# Patient Record
Sex: FEMALE | Race: WHITE | NOT HISPANIC OR LATINO
[De-identification: names, ages, dates, MRNs, and addresses within clinical notes are randomized per-mention and may not be internally consistent; named-entity substitution may affect disease eponyms.]

---

## 2017-02-01 ENCOUNTER — RESULT REVIEW (OUTPATIENT)
Age: 47
End: 2017-02-01

## 2017-02-16 ENCOUNTER — APPOINTMENT (OUTPATIENT)
Dept: VASCULAR SURGERY | Facility: CLINIC | Age: 47
End: 2017-02-16

## 2017-02-16 VITALS
TEMPERATURE: 98.1 F | HEART RATE: 78 BPM | HEIGHT: 68 IN | WEIGHT: 168 LBS | BODY MASS INDEX: 25.46 KG/M2 | SYSTOLIC BLOOD PRESSURE: 125 MMHG | DIASTOLIC BLOOD PRESSURE: 80 MMHG

## 2017-02-16 VITALS — HEART RATE: 76 BPM | DIASTOLIC BLOOD PRESSURE: 75 MMHG | SYSTOLIC BLOOD PRESSURE: 112 MMHG

## 2017-02-16 DIAGNOSIS — I86.8 VARICOSE VEINS OF OTHER SPECIFIED SITES: ICD-10-CM

## 2017-03-01 ENCOUNTER — OTHER (OUTPATIENT)
Age: 47
End: 2017-03-01

## 2017-03-06 ENCOUNTER — APPOINTMENT (OUTPATIENT)
Dept: VASCULAR SURGERY | Facility: CLINIC | Age: 47
End: 2017-03-06

## 2017-03-22 RX ORDER — ALPRAZOLAM 0.5 MG/1
0.5 TABLET ORAL
Qty: 1 | Refills: 0 | Status: COMPLETED | COMMUNITY
Start: 2017-03-01 | End: 2017-03-22

## 2017-03-23 ENCOUNTER — MEDICATION RENEWAL (OUTPATIENT)
Age: 47
End: 2017-03-23

## 2017-03-23 ENCOUNTER — APPOINTMENT (OUTPATIENT)
Dept: VASCULAR SURGERY | Facility: CLINIC | Age: 47
End: 2017-03-23

## 2017-03-23 VITALS
HEART RATE: 78 BPM | TEMPERATURE: 97.7 F | WEIGHT: 168 LBS | HEIGHT: 68 IN | DIASTOLIC BLOOD PRESSURE: 72 MMHG | SYSTOLIC BLOOD PRESSURE: 139 MMHG | BODY MASS INDEX: 25.46 KG/M2

## 2017-03-23 VITALS — DIASTOLIC BLOOD PRESSURE: 85 MMHG | HEART RATE: 76 BPM | SYSTOLIC BLOOD PRESSURE: 139 MMHG

## 2017-03-23 DIAGNOSIS — Z78.9 OTHER SPECIFIED HEALTH STATUS: ICD-10-CM

## 2017-03-23 DIAGNOSIS — Z87.891 PERSONAL HISTORY OF NICOTINE DEPENDENCE: ICD-10-CM

## 2017-03-23 DIAGNOSIS — F17.200 NICOTINE DEPENDENCE, UNSPECIFIED, UNCOMPLICATED: ICD-10-CM

## 2017-03-23 DIAGNOSIS — Z82.49 FAMILY HISTORY OF ISCHEMIC HEART DISEASE AND OTHER DISEASES OF THE CIRCULATORY SYSTEM: ICD-10-CM

## 2017-03-24 PROBLEM — F17.200 CURRENT SOME DAY SMOKER: Status: ACTIVE | Noted: 2017-02-16

## 2017-03-24 PROBLEM — Z82.49 FAMILY HISTORY OF HYPERTENSION: Status: ACTIVE | Noted: 2017-02-16

## 2017-03-24 PROBLEM — Z78.9 DRINKS WINE: Status: ACTIVE | Noted: 2017-02-16

## 2017-03-24 RX ORDER — ALPRAZOLAM 0.5 MG/1
0.5 TABLET ORAL
Qty: 1 | Refills: 0 | Status: COMPLETED | COMMUNITY
Start: 2017-03-23 | End: 2017-03-23

## 2017-03-27 ENCOUNTER — MEDICATION RENEWAL (OUTPATIENT)
Age: 47
End: 2017-03-27

## 2017-03-31 ENCOUNTER — APPOINTMENT (OUTPATIENT)
Dept: VASCULAR SURGERY | Facility: CLINIC | Age: 47
End: 2017-03-31

## 2017-04-20 ENCOUNTER — APPOINTMENT (OUTPATIENT)
Dept: VASCULAR SURGERY | Facility: CLINIC | Age: 47
End: 2017-04-20

## 2017-04-20 VITALS
SYSTOLIC BLOOD PRESSURE: 111 MMHG | DIASTOLIC BLOOD PRESSURE: 72 MMHG | WEIGHT: 168 LBS | BODY MASS INDEX: 25.46 KG/M2 | HEART RATE: 91 BPM | TEMPERATURE: 98.2 F | HEIGHT: 68 IN

## 2017-04-20 VITALS — HEART RATE: 78 BPM | DIASTOLIC BLOOD PRESSURE: 62 MMHG | SYSTOLIC BLOOD PRESSURE: 103 MMHG

## 2017-04-20 RX ORDER — NITROFURANTOIN (MONOHYDRATE/MACROCRYSTALS) 25; 75 MG/1; MG/1
100 CAPSULE ORAL
Qty: 14 | Refills: 0 | Status: ACTIVE | COMMUNITY
Start: 2016-11-29

## 2017-04-20 RX ORDER — CYCLOBENZAPRINE HYDROCHLORIDE 5 MG/1
5 TABLET, FILM COATED ORAL
Qty: 30 | Refills: 0 | Status: ACTIVE | COMMUNITY
Start: 2017-01-28

## 2017-04-20 RX ORDER — ECONAZOLE NITRATE 10 MG/G
1 CREAM TOPICAL
Qty: 85 | Refills: 0 | Status: ACTIVE | COMMUNITY
Start: 2017-01-28

## 2017-04-20 RX ORDER — SILVER SULFADIAZINE 10 MG/G
1 CREAM TOPICAL
Qty: 50 | Refills: 0 | Status: ACTIVE | COMMUNITY
Start: 2017-04-04

## 2017-04-21 ENCOUNTER — APPOINTMENT (OUTPATIENT)
Dept: VASCULAR SURGERY | Facility: CLINIC | Age: 47
End: 2017-04-21

## 2018-04-27 ENCOUNTER — RESULT REVIEW (OUTPATIENT)
Age: 48
End: 2018-04-27

## 2019-03-20 ENCOUNTER — OUTPATIENT (OUTPATIENT)
Dept: OUTPATIENT SERVICES | Facility: HOSPITAL | Age: 49
LOS: 1 days | End: 2019-03-20
Payer: COMMERCIAL

## 2019-03-20 VITALS
RESPIRATION RATE: 18 BRPM | HEIGHT: 67 IN | DIASTOLIC BLOOD PRESSURE: 86 MMHG | TEMPERATURE: 99 F | SYSTOLIC BLOOD PRESSURE: 128 MMHG | OXYGEN SATURATION: 98 % | WEIGHT: 182.1 LBS | HEART RATE: 76 BPM

## 2019-03-20 DIAGNOSIS — Z98.890 OTHER SPECIFIED POSTPROCEDURAL STATES: Chronic | ICD-10-CM

## 2019-03-20 DIAGNOSIS — D25.9 LEIOMYOMA OF UTERUS, UNSPECIFIED: ICD-10-CM

## 2019-03-20 DIAGNOSIS — Z01.818 ENCOUNTER FOR OTHER PREPROCEDURAL EXAMINATION: ICD-10-CM

## 2019-03-20 LAB
HCT VFR BLD CALC: 41.5 % — SIGNIFICANT CHANGE UP (ref 34.5–45)
HGB BLD-MCNC: 13.2 G/DL — SIGNIFICANT CHANGE UP (ref 11.5–15.5)
MCHC RBC-ENTMCNC: 30.6 PG — SIGNIFICANT CHANGE UP (ref 27–34)
MCHC RBC-ENTMCNC: 31.8 GM/DL — LOW (ref 32–36)
MCV RBC AUTO: 96.1 FL — SIGNIFICANT CHANGE UP (ref 80–100)
PLATELET # BLD AUTO: 235 K/UL — SIGNIFICANT CHANGE UP (ref 150–400)
RBC # BLD: 4.32 M/UL — SIGNIFICANT CHANGE UP (ref 3.8–5.2)
RBC # FLD: 12.8 % — SIGNIFICANT CHANGE UP (ref 10.3–14.5)
WBC # BLD: 4.18 K/UL — SIGNIFICANT CHANGE UP (ref 3.8–10.5)
WBC # FLD AUTO: 4.18 K/UL — SIGNIFICANT CHANGE UP (ref 3.8–10.5)

## 2019-03-20 PROCEDURE — G0463: CPT

## 2019-03-20 PROCEDURE — 85027 COMPLETE CBC AUTOMATED: CPT

## 2019-03-20 RX ORDER — LIDOCAINE HCL 20 MG/ML
0.2 VIAL (ML) INJECTION ONCE
Qty: 0 | Refills: 0 | Status: DISCONTINUED | OUTPATIENT
Start: 2019-03-22 | End: 2019-04-06

## 2019-03-20 RX ORDER — SODIUM CHLORIDE 9 MG/ML
3 INJECTION INTRAMUSCULAR; INTRAVENOUS; SUBCUTANEOUS EVERY 8 HOURS
Qty: 0 | Refills: 0 | Status: DISCONTINUED | OUTPATIENT
Start: 2019-03-22 | End: 2019-04-06

## 2019-03-20 NOTE — H&P PST ADULT - NSANTHOSAYNRD_GEN_A_CORE
No. ANEUDY screening performed.  STOP BANG Legend: 0-2 = LOW Risk; 3-4 = INTERMEDIATE Risk; 5-8 = HIGH Risk

## 2019-03-20 NOTE — H&P PST ADULT - NSICDXPASTMEDICALHX_GEN_ALL_CORE_FT
PAST MEDICAL HISTORY:  Anxiety xanax rare occasion    Gluteal pain sees a chiropractor    History of tinea on bk since agwe 13    Uterine fibroid see hpi

## 2019-03-20 NOTE — H&P PST ADULT - HISTORY OF PRESENT ILLNESS
This is a 48 year old G 2 P 2002, LMP 3-05-19.  Pt had  abnormal menses x 23 months.  Pt went to GYN and transvaginal sonogram was done.  Pt had a hysterosalpingogram done and fibroid diagnosed.  Now scheduled for D&C  operative hysteroscopy with bettie on 3-22-19 This is a 48 year old G 2 P 2002, LMP 3-05-19.  Pt had  abnormal menses x 23 months.  Pt went to GYN and transvaginal sonogram was done.  Pt had a Sonohystrogram done and fibroid diagnosed.  Now scheduled for D&C  operative hysteroscopy with bettie on 3-22-19

## 2019-03-20 NOTE — H&P PST ADULT - NSANTHSNORERD_ENT_A_CORE
Principal Discharge DX:	Abdominal pain  Instructions for follow-up, activity and diet:	1) Please return to the ED should you have any new or worsening symptoms, worsening pain, develop fever, chills, nausea, vomiting, or diarrhea, or recurrent painful episodes of abdominal pain  2) Please follow up with your primary care physician in 1-2 days
No

## 2019-03-20 NOTE — H&P PST ADULT - NSICDXPROBLEM_GEN_ALL_CORE_FT
PROBLEM DIAGNOSES  Problem: Uterine fibroid  Assessment and Plan: D&C operative hysteroscopy, symphion

## 2019-03-21 ENCOUNTER — TRANSCRIPTION ENCOUNTER (OUTPATIENT)
Age: 49
End: 2019-03-21

## 2019-03-22 ENCOUNTER — RESULT REVIEW (OUTPATIENT)
Age: 49
End: 2019-03-22

## 2019-03-22 ENCOUNTER — OUTPATIENT (OUTPATIENT)
Dept: OUTPATIENT SERVICES | Facility: HOSPITAL | Age: 49
LOS: 1 days | End: 2019-03-22
Payer: COMMERCIAL

## 2019-03-22 VITALS
OXYGEN SATURATION: 98 % | HEART RATE: 76 BPM | RESPIRATION RATE: 18 BRPM | SYSTOLIC BLOOD PRESSURE: 130 MMHG | TEMPERATURE: 99 F | DIASTOLIC BLOOD PRESSURE: 88 MMHG | WEIGHT: 182.1 LBS | HEIGHT: 67 IN

## 2019-03-22 VITALS
HEART RATE: 77 BPM | DIASTOLIC BLOOD PRESSURE: 81 MMHG | RESPIRATION RATE: 16 BRPM | OXYGEN SATURATION: 100 % | SYSTOLIC BLOOD PRESSURE: 135 MMHG

## 2019-03-22 DIAGNOSIS — Z98.890 OTHER SPECIFIED POSTPROCEDURAL STATES: Chronic | ICD-10-CM

## 2019-03-22 DIAGNOSIS — D25.9 LEIOMYOMA OF UTERUS, UNSPECIFIED: ICD-10-CM

## 2019-03-22 PROCEDURE — 88305 TISSUE EXAM BY PATHOLOGIST: CPT

## 2019-03-22 PROCEDURE — 58561 HYSTEROSCOPY REMOVE MYOMA: CPT

## 2019-03-22 PROCEDURE — 88305 TISSUE EXAM BY PATHOLOGIST: CPT | Mod: 26

## 2019-03-22 RX ORDER — ACETAMINOPHEN 500 MG
1000 TABLET ORAL ONCE
Qty: 0 | Refills: 0 | Status: COMPLETED | OUTPATIENT
Start: 2019-03-22 | End: 2019-03-22

## 2019-03-22 RX ORDER — OXYCODONE HYDROCHLORIDE 5 MG/1
5 TABLET ORAL ONCE
Qty: 0 | Refills: 0 | Status: DISCONTINUED | OUTPATIENT
Start: 2019-03-22 | End: 2019-03-22

## 2019-03-22 RX ORDER — ONDANSETRON 8 MG/1
4 TABLET, FILM COATED ORAL ONCE
Qty: 0 | Refills: 0 | Status: DISCONTINUED | OUTPATIENT
Start: 2019-03-22 | End: 2019-04-06

## 2019-03-22 RX ORDER — CELECOXIB 200 MG/1
200 CAPSULE ORAL ONCE
Qty: 0 | Refills: 0 | Status: DISCONTINUED | OUTPATIENT
Start: 2019-03-22 | End: 2019-04-06

## 2019-03-22 RX ORDER — CELECOXIB 200 MG/1
200 CAPSULE ORAL ONCE
Qty: 0 | Refills: 0 | Status: COMPLETED | OUTPATIENT
Start: 2019-03-22 | End: 2019-03-22

## 2019-03-22 RX ORDER — HYDROMORPHONE HYDROCHLORIDE 2 MG/ML
0.5 INJECTION INTRAMUSCULAR; INTRAVENOUS; SUBCUTANEOUS
Qty: 0 | Refills: 0 | Status: DISCONTINUED | OUTPATIENT
Start: 2019-03-22 | End: 2019-03-22

## 2019-03-22 RX ORDER — METOCLOPRAMIDE HCL 10 MG
10 TABLET ORAL ONCE
Qty: 0 | Refills: 0 | Status: DISCONTINUED | OUTPATIENT
Start: 2019-03-22 | End: 2019-04-06

## 2019-03-22 RX ADMIN — Medication 1000 MILLIGRAM(S): at 11:05

## 2019-03-22 RX ADMIN — CELECOXIB 200 MILLIGRAM(S): 200 CAPSULE ORAL at 11:05

## 2019-03-22 NOTE — BRIEF OPERATIVE NOTE - NSICDXBRIEFPOSTOP_GEN_ALL_CORE_FT
POST-OP DIAGNOSIS:  Submucous uterine fibroid 22-Mar-2019 12:35:24  Savannah Saab  Menorrhagia 22-Mar-2019 12:35:16  Savannah Saab

## 2019-03-22 NOTE — BRIEF OPERATIVE NOTE - NSICDXBRIEFPREOP_GEN_ALL_CORE_FT
PRE-OP DIAGNOSIS:  Submucous uterine fibroid 22-Mar-2019 12:34:53  Savannah Saab  Menorrhagia 22-Mar-2019 12:34:06  Savannah Saab

## 2019-03-22 NOTE — ASU DISCHARGE PLAN (ADULT/PEDIATRIC) - CARE PROVIDER_API CALL
jacque,   Phone: (   )    -  Fax: (   )    -  Follow Up Time:     Savannah Saab)  Obstetrics and Gynecology  [address] [facility], [facility] [address]  [place] [address]  Phone: [phone]  Fax: [phone]  Follow Up Time:

## 2019-06-05 ENCOUNTER — RESULT REVIEW (OUTPATIENT)
Age: 49
End: 2019-06-05

## 2020-12-04 ENCOUNTER — RESULT REVIEW (OUTPATIENT)
Age: 50
End: 2020-12-04
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
## 2022-04-07 ENCOUNTER — RESULT REVIEW (OUTPATIENT)
Age: 52
End: 2022-04-07

## 2022-10-20 PROBLEM — Z86.19 PERSONAL HISTORY OF OTHER INFECTIOUS AND PARASITIC DISEASES: Chronic | Status: ACTIVE | Noted: 2019-03-20

## 2022-10-20 PROBLEM — M79.18 MYALGIA, OTHER SITE: Chronic | Status: ACTIVE | Noted: 2019-03-20

## 2022-10-20 PROBLEM — D25.9 LEIOMYOMA OF UTERUS, UNSPECIFIED: Chronic | Status: ACTIVE | Noted: 2019-03-20

## 2022-10-20 PROBLEM — F41.9 ANXIETY DISORDER, UNSPECIFIED: Chronic | Status: ACTIVE | Noted: 2019-03-20

## 2022-12-01 ENCOUNTER — APPOINTMENT (OUTPATIENT)
Dept: VASCULAR SURGERY | Facility: CLINIC | Age: 52
End: 2022-12-01

## 2022-12-01 VITALS
HEART RATE: 77 BPM | DIASTOLIC BLOOD PRESSURE: 87 MMHG | BODY MASS INDEX: 26.52 KG/M2 | WEIGHT: 175 LBS | HEIGHT: 68 IN | TEMPERATURE: 98.2 F | SYSTOLIC BLOOD PRESSURE: 120 MMHG

## 2022-12-01 VITALS — HEART RATE: 72 BPM | SYSTOLIC BLOOD PRESSURE: 125 MMHG | DIASTOLIC BLOOD PRESSURE: 83 MMHG

## 2022-12-01 DIAGNOSIS — I83.899 VARICOSE VEINS OF UNSPECIFIED LOWER EXTREMITY WITH OTHER COMPLICATIONS: ICD-10-CM

## 2022-12-01 PROCEDURE — 93970 EXTREMITY STUDY: CPT

## 2022-12-01 PROCEDURE — 99204 OFFICE O/P NEW MOD 45 MIN: CPT

## 2022-12-16 ENCOUNTER — APPOINTMENT (OUTPATIENT)
Dept: VASCULAR SURGERY | Facility: CLINIC | Age: 52
End: 2022-12-16

## 2022-12-16 PROCEDURE — 37766 PHLEB VEINS - EXTREM 20+: CPT | Mod: LT

## 2022-12-16 RX ORDER — ALPRAZOLAM 0.5 MG/1
0.5 TABLET ORAL
Qty: 1 | Refills: 0 | Status: COMPLETED | COMMUNITY
Start: 2017-03-27 | End: 2022-12-16

## 2022-12-16 NOTE — PROCEDURE
[FreeTextEntry1] : left stab phlebectomy [FreeTextEntry3] : Procedural safety checklist and time out completed:\par Confirmed patient identification (Patient Name, , and/or medical record number including when possible affirmation by patient or parent/family/other.\par Confirmed procedure with the patient. Consent present, accurate and signed. \par Confirmed special equipment and supplies are present.\par Sterility confirmed. Position verified. \par Site/ side is marked and visible and confirmed. \par Procedure confirmed by consent. Accurate consent including side and site.\par Review of medical records noting correct procedure including site and side.\par MD/PA verifies presence and review of imaging studies and or written report of imaging studies.\par Specify equipment are available for the planned procedure.\par MD/PA has marked the patient's procedural site and side.\par Agreement on the procedure to be performed\par Time out completed.\par All of the above has been confirmed by the team.\par All patient-specific concerns have been addressed. \par \par Indication:  left lower extremity varicose veins with inflammation, leg pain, leg swelling, and leg cramping.  \par \par Procedure: Stab phlebectomy left  lower extremity\par \par  Ms. VICKY HARRIS is a 52 year old F with a history of symptomatic left lower extremity varicose veins. A trial of compression stockings, exercise, elevation, and pain medication was attempted without relief and definitive treatment with microphlebectomy was offered. \par \par I have discussed the risks of the procedure at length with the patient. The risks discussed were inclusive of but not limited to infection, irritation at the site of infiltration of local anesthesia, and also rare risk of deep venous thrombosis and pulmonary emboli. The patient agrees to proceed with the procedure. \par The patient was escorted into the procedure room, the varicose veins for treatment were marked out and the patient placed on the examination table. The entire limb was prepped and draped in sterile fashion and a  time out was called. \par \par Local anesthesia using 35 cc 1% lidocaine was infiltrated using a 25 gauge needle over the previously marked prominent varicose vein sites.\par Multiple small stab incisions, each less than 1 cm in length was made at the noted sites. With the help of a vein hook, the vein was fished out at each of these sites, rolled over a narrow-tipped mosquito clamp and removed. Hemostasis was secured with leg elevation and application of manual pressure. After assuring hemostasis, a sterile 4x4 was placed on the access sites and an ACE compression wrap was applied. Estimated blood loss: minimal. Patient tolerated procedure well. Patient was given post-procedure instructions and follow up appointment was scheduled. \par \par SIDE - LEFT	\par SITE - CALF / THIGH\par LOCATION - MEDIAL / LATERAL / ANTERIOR \par Total Stab Incisions > 20\par \par

## 2022-12-19 ENCOUNTER — NON-APPOINTMENT (OUTPATIENT)
Age: 52
End: 2022-12-19

## 2022-12-28 RX ORDER — ALPRAZOLAM 0.5 MG/1
0.5 TABLET ORAL
Qty: 1 | Refills: 0 | Status: COMPLETED | COMMUNITY
Start: 2022-12-28 | End: 2023-01-06

## 2023-01-06 ENCOUNTER — APPOINTMENT (OUTPATIENT)
Dept: VASCULAR SURGERY | Facility: CLINIC | Age: 53
End: 2023-01-06
Payer: COMMERCIAL

## 2023-01-06 PROCEDURE — 37765 STAB PHLEB VEINS XTR 10-20: CPT | Mod: LT

## 2023-01-06 NOTE — PROCEDURE
[FreeTextEntry1] : left stab phlebectomy [FreeTextEntry3] : Procedural safety checklist and time out completed:\par Confirmed patient identification (Patient Name, , and/or medical record number including when possible affirmation by patient or parent/family/other.\par Confirmed procedure with the patient. Consent present, accurate and signed. \par Confirmed special equipment and supplies are present.\par Sterility confirmed. Position verified. \par Site/ side is marked and visible and confirmed. \par Procedure confirmed by consent. Accurate consent including side and site.\par Review of medical records noting correct procedure including site and side.\par MD/PA verifies presence and review of imaging studies and or written report of imaging studies.\par Specify equipment are available for the planned procedure.\par MD/PA has marked the patient's procedural site and side.\par Agreement on the procedure to be performed\par Time out completed.\par All of the above has been confirmed by the team.\par All patient-specific concerns have been addressed. \par \par Indication:  left lower extremity varicose veins with inflammation, leg pain, leg swelling, and leg cramping.  \par \par Procedure: Stab phlebectomy left lower extremity\par \par  Ms. VICKY HARRIS is a 52 year old F with a history of symptomatic left lower extremity varicose veins. A trial of compression stockings, exercise, elevation, and pain medication was attempted without relief and definitive treatment with microphlebectomy was offered. \par \par I have discussed the risks of the procedure at length with the patient. The risks discussed were inclusive of but not limited to infection, irritation at the site of infiltration of local anesthesia, and also rare risk of deep venous thrombosis and pulmonary emboli. The patient agrees to proceed with the procedure. \par The patient was escorted into the procedure room, the varicose veins for treatment were marked out and the patient placed on the examination table. The entire limb was prepped and draped in sterile fashion and a  time out was called. \par \par Local anesthesia using __ cc 1% lidocaine was infiltrated using a 25 gauge needle over the previously marked prominent varicose vein sites.\par Multiple small stab incisions, each less than 1 cm in length was made at the noted sites. With the help of a vein hook, the vein was fished out at each of these sites, rolled over a narrow-tipped mosquito clamp and removed. Hemostasis was secured with leg elevation and application of manual pressure. After assuring hemostasis, a sterile 4x4 was placed on the access sites and an ACE compression wrap was applied. Estimated blood loss: minimal. Patient tolerated procedure well. Patient was given post-procedure instructions and follow up appointment was scheduled. \par \par SIDE - LEFT	\par SITE - CALF \par LOCATION - LATERAL POSTERIOR	\par Total Stab Incisions 10-20\par \par

## 2023-01-10 ENCOUNTER — EMERGENCY (EMERGENCY)
Facility: HOSPITAL | Age: 53
LOS: 1 days | Discharge: HOME | End: 2023-01-10
Attending: STUDENT IN AN ORGANIZED HEALTH CARE EDUCATION/TRAINING PROGRAM | Admitting: EMERGENCY MEDICINE
Payer: COMMERCIAL

## 2023-01-10 VITALS
SYSTOLIC BLOOD PRESSURE: 142 MMHG | HEART RATE: 91 BPM | RESPIRATION RATE: 16 BRPM | DIASTOLIC BLOOD PRESSURE: 89 MMHG | TEMPERATURE: 98 F | OXYGEN SATURATION: 100 %

## 2023-01-10 DIAGNOSIS — Z98.890 OTHER SPECIFIED POSTPROCEDURAL STATES: Chronic | ICD-10-CM

## 2023-01-10 LAB
ALBUMIN SERPL ELPH-MCNC: 4.8 G/DL — SIGNIFICANT CHANGE UP (ref 3.3–5)
ALP SERPL-CCNC: 54 U/L — SIGNIFICANT CHANGE UP (ref 40–120)
ALT FLD-CCNC: 22 U/L — SIGNIFICANT CHANGE UP (ref 4–33)
ANION GAP SERPL CALC-SCNC: 14 MMOL/L — SIGNIFICANT CHANGE UP (ref 7–14)
APTT BLD: 26.9 SEC — LOW (ref 27–36.3)
AST SERPL-CCNC: 29 U/L — SIGNIFICANT CHANGE UP (ref 4–32)
BASOPHILS # BLD AUTO: 0.02 K/UL — SIGNIFICANT CHANGE UP (ref 0–0.2)
BASOPHILS NFR BLD AUTO: 0.2 % — SIGNIFICANT CHANGE UP (ref 0–2)
BILIRUB SERPL-MCNC: 0.3 MG/DL — SIGNIFICANT CHANGE UP (ref 0.2–1.2)
BLD GP AB SCN SERPL QL: NEGATIVE — SIGNIFICANT CHANGE UP
BUN SERPL-MCNC: 11 MG/DL — SIGNIFICANT CHANGE UP (ref 7–23)
CALCIUM SERPL-MCNC: 8.6 MG/DL — SIGNIFICANT CHANGE UP (ref 8.4–10.5)
CHLORIDE SERPL-SCNC: 99 MMOL/L — SIGNIFICANT CHANGE UP (ref 98–107)
CO2 SERPL-SCNC: 23 MMOL/L — SIGNIFICANT CHANGE UP (ref 22–31)
CREAT SERPL-MCNC: 0.61 MG/DL — SIGNIFICANT CHANGE UP (ref 0.5–1.3)
EGFR: 108 ML/MIN/1.73M2 — SIGNIFICANT CHANGE UP
EOSINOPHIL # BLD AUTO: 0.02 K/UL — SIGNIFICANT CHANGE UP (ref 0–0.5)
EOSINOPHIL NFR BLD AUTO: 0.2 % — SIGNIFICANT CHANGE UP (ref 0–6)
FLUAV AG NPH QL: SIGNIFICANT CHANGE UP
FLUBV AG NPH QL: SIGNIFICANT CHANGE UP
GLUCOSE SERPL-MCNC: 92 MG/DL — SIGNIFICANT CHANGE UP (ref 70–99)
HCT VFR BLD CALC: 41.9 % — SIGNIFICANT CHANGE UP (ref 34.5–45)
HGB BLD-MCNC: 14.1 G/DL — SIGNIFICANT CHANGE UP (ref 11.5–15.5)
IANC: 6.51 K/UL — SIGNIFICANT CHANGE UP (ref 1.8–7.4)
IMM GRANULOCYTES NFR BLD AUTO: 0.5 % — SIGNIFICANT CHANGE UP (ref 0–0.9)
INR BLD: 0.91 RATIO — SIGNIFICANT CHANGE UP (ref 0.88–1.16)
LYMPHOCYTES # BLD AUTO: 1.14 K/UL — SIGNIFICANT CHANGE UP (ref 1–3.3)
LYMPHOCYTES # BLD AUTO: 14 % — SIGNIFICANT CHANGE UP (ref 13–44)
MCHC RBC-ENTMCNC: 31.1 PG — SIGNIFICANT CHANGE UP (ref 27–34)
MCHC RBC-ENTMCNC: 33.7 GM/DL — SIGNIFICANT CHANGE UP (ref 32–36)
MCV RBC AUTO: 92.5 FL — SIGNIFICANT CHANGE UP (ref 80–100)
MONOCYTES # BLD AUTO: 0.44 K/UL — SIGNIFICANT CHANGE UP (ref 0–0.9)
MONOCYTES NFR BLD AUTO: 5.4 % — SIGNIFICANT CHANGE UP (ref 2–14)
NEUTROPHILS # BLD AUTO: 6.51 K/UL — SIGNIFICANT CHANGE UP (ref 1.8–7.4)
NEUTROPHILS NFR BLD AUTO: 79.7 % — HIGH (ref 43–77)
NRBC # BLD: 0 /100 WBCS — SIGNIFICANT CHANGE UP (ref 0–0)
NRBC # FLD: 0 K/UL — SIGNIFICANT CHANGE UP (ref 0–0)
PLATELET # BLD AUTO: 291 K/UL — SIGNIFICANT CHANGE UP (ref 150–400)
POTASSIUM SERPL-MCNC: 3.9 MMOL/L — SIGNIFICANT CHANGE UP (ref 3.5–5.3)
POTASSIUM SERPL-SCNC: 3.9 MMOL/L — SIGNIFICANT CHANGE UP (ref 3.5–5.3)
PROT SERPL-MCNC: 8.1 G/DL — SIGNIFICANT CHANGE UP (ref 6–8.3)
PROTHROM AB SERPL-ACNC: 10.6 SEC — SIGNIFICANT CHANGE UP (ref 10.5–13.4)
RBC # BLD: 4.53 M/UL — SIGNIFICANT CHANGE UP (ref 3.8–5.2)
RBC # FLD: 13.6 % — SIGNIFICANT CHANGE UP (ref 10.3–14.5)
RH IG SCN BLD-IMP: POSITIVE — SIGNIFICANT CHANGE UP
RSV RNA NPH QL NAA+NON-PROBE: SIGNIFICANT CHANGE UP
SARS-COV-2 RNA SPEC QL NAA+PROBE: SIGNIFICANT CHANGE UP
SODIUM SERPL-SCNC: 136 MMOL/L — SIGNIFICANT CHANGE UP (ref 135–145)
WBC # BLD: 8.17 K/UL — SIGNIFICANT CHANGE UP (ref 3.8–10.5)
WBC # FLD AUTO: 8.17 K/UL — SIGNIFICANT CHANGE UP (ref 3.8–10.5)

## 2023-01-10 PROCEDURE — 25605 CLTX DST RDL FX/EPHYS SEP W/: CPT | Mod: LT

## 2023-01-10 PROCEDURE — 99281 EMR DPT VST MAYX REQ PHY/QHP: CPT

## 2023-01-10 PROCEDURE — 73090 X-RAY EXAM OF FOREARM: CPT | Mod: 26,LT,77

## 2023-01-10 PROCEDURE — 73110 X-RAY EXAM OF WRIST: CPT | Mod: 26,LT

## 2023-01-10 PROCEDURE — 73080 X-RAY EXAM OF ELBOW: CPT | Mod: 26,LT

## 2023-01-10 PROCEDURE — 73090 X-RAY EXAM OF FOREARM: CPT | Mod: 26,LT

## 2023-01-10 PROCEDURE — 99283 EMERGENCY DEPT VISIT LOW MDM: CPT | Mod: 57

## 2023-01-10 PROCEDURE — 73030 X-RAY EXAM OF SHOULDER: CPT | Mod: 26,LT

## 2023-01-10 PROCEDURE — 99285 EMERGENCY DEPT VISIT HI MDM: CPT

## 2023-01-10 RX ORDER — CEPHALEXIN 500 MG
1 CAPSULE ORAL
Qty: 28 | Refills: 0
Start: 2023-01-10 | End: 2023-01-16

## 2023-01-10 RX ORDER — MORPHINE SULFATE 50 MG/1
2 CAPSULE, EXTENDED RELEASE ORAL ONCE
Refills: 0 | Status: DISCONTINUED | OUTPATIENT
Start: 2023-01-10 | End: 2023-01-10

## 2023-01-10 RX ORDER — TETANUS TOXOID, REDUCED DIPHTHERIA TOXOID AND ACELLULAR PERTUSSIS VACCINE, ADSORBED 5; 2.5; 8; 8; 2.5 [IU]/.5ML; [IU]/.5ML; UG/.5ML; UG/.5ML; UG/.5ML
0.5 SUSPENSION INTRAMUSCULAR ONCE
Refills: 0 | Status: COMPLETED | OUTPATIENT
Start: 2023-01-10 | End: 2023-01-10

## 2023-01-10 RX ORDER — MORPHINE SULFATE 50 MG/1
4 CAPSULE, EXTENDED RELEASE ORAL ONCE
Refills: 0 | Status: DISCONTINUED | OUTPATIENT
Start: 2023-01-10 | End: 2023-01-10

## 2023-01-10 RX ORDER — CEFAZOLIN SODIUM 1 G
2000 VIAL (EA) INJECTION ONCE
Refills: 0 | Status: COMPLETED | OUTPATIENT
Start: 2023-01-10 | End: 2023-01-10

## 2023-01-10 RX ADMIN — MORPHINE SULFATE 2 MILLIGRAM(S): 50 CAPSULE, EXTENDED RELEASE ORAL at 14:13

## 2023-01-10 RX ADMIN — MORPHINE SULFATE 4 MILLIGRAM(S): 50 CAPSULE, EXTENDED RELEASE ORAL at 15:49

## 2023-01-10 RX ADMIN — TETANUS TOXOID, REDUCED DIPHTHERIA TOXOID AND ACELLULAR PERTUSSIS VACCINE, ADSORBED 0.5 MILLILITER(S): 5; 2.5; 8; 8; 2.5 SUSPENSION INTRAMUSCULAR at 14:30

## 2023-01-10 RX ADMIN — Medication 100 MILLIGRAM(S): at 14:14

## 2023-01-10 NOTE — ED PROVIDER NOTE - OBJECTIVE STATEMENT
53 yo F no PMH presenting with L hand/wrist injury after fall. Denies head injury or LOC, no blood tinner use. Denies numbness, tingling. Denies other injury.

## 2023-01-10 NOTE — ED PROVIDER NOTE - CARE PROVIDER_API CALL
Jessica Khanna)  [address], [facility] [address]  [place] [address]  Phone: [phone]  Fax: [phone]  Follow Up Time: 1-3 Days

## 2023-01-10 NOTE — ED PROVIDER NOTE - PROGRESS NOTE DETAILS
[employer]:  received sign out on patient.  fracture reduced by ortho.  pt feeling much better at this time.  will discharge with outpatient ortho followup and prescribe keflex for open fracture

## 2023-01-10 NOTE — ED PROVIDER NOTE - NSFOLLOWUPINSTRUCTIONS_ED_ALL_ED_FT
Follow up with orthopedics, Dr. Khanna in 2-3 days.    Take keflex 500mg every 6 hours for 1 week.    Take acetaminophen 975mg every 6 hours as needed for pain.    Return to the ED for worse pain, swelling, fever or other emergent concerns.

## 2023-01-10 NOTE — ED ADULT TRIAGE NOTE - CHIEF COMPLAINT QUOTE
Pt s/p  trip and fall today c/o L wrist pain swelling.  Denies head injury. Pt stated her legs locked"

## 2023-01-10 NOTE — CONSULT NOTE ADULT - SUBJECTIVE AND OBJECTIVE BOX
HPI: 52y year old Female RHD  s/p mechanical fall.  Pt reports feeling muscle spasm in R leg and falling while walking down last 2 steps of stairs.  Landed on outstretched hand.  Patient had deformity and pain in L wrist.  Patient denies pain in any other joint, numbness, tingling, paresthesias. Also reports mild L shoulder pain. No other orthopedic injuries.  No Head trauma or LOC.    PMH:  No pertinent past medical history    Uterine fibroid    Anxiety    Gluteal pain    History of tinea      [ ] No past medical history    PSH:  H/O breast biopsy    S/P vein stripping    S/P cone biopsy of cervix      [ ] No past surgical history    Allergies:  adhesives (Other)  latex (Rash)  No Known Drug Allergies      O:  T(C): 36.4 (01-10-23 @ 12:51), Max: 36.4 (01-10-23 @ 12:51)  HR: 91 (01-10-23 @ 12:51) (91 - 91)  BP: 142/89 (01-10-23 @ 12:51) (142/89 - 142/89)  RR: 16 (01-10-23 @ 12:51) (16 - 16)  SpO2: 100% (01-10-23 @ 12:51) (100% - 100%)    Gen  LUE:  Skin intact  Swollen wrist  AIN/PIN/U Intact  SILT M/U/R  Radial pulse palpable, WWP distally      RUE / B/L LE:  No bony TTP; Good ROM w/o pain. Able to SLR B/L. Exam Unremarkable.     Imaging:    Patient underwent hematoma block.  Injected with 10 cc of 1% lidocaine without epinephrine into hematoma.  Hung in traction and reduced. Patient placed in sugartong splint.  Post reduction x-rays reviewed in improved alignment.      A/P 52y year old Female with L distal radius fracture  Pain Control  NWB LUE  Sling for comfort  F/u as outpatient in 1 week with Dr Khanna

## 2023-01-10 NOTE — ED PROVIDER NOTE - CLINICAL SUMMARY MEDICAL DECISION MAKING FREE TEXT BOX
51 yo F presenting with L wrist injury s/p fall. Concern for open fracture as there is skin opening around area of deformity. Given ancef. Pt does not recall last tdap, will update tdap. Plan for labs, IV pain meds, XR, ortho consult and dispo per ortho.

## 2023-01-10 NOTE — ED ADULT NURSE NOTE - OBJECTIVE STATEMENT
Pt received to intake c/o left wrist pain and swelling. Pt a&ox4, ambulatory at baseline, awaiting MD cisse, will await orders and continue to monitor.

## 2023-01-10 NOTE — CONSULT NOTE ADULT - ASSESSMENT
A/P: 53 y/o Female with Left distal radius and ulna fracture w/ poke hole  - Pain control  - FU XR R shoulder  - FU post reduction XR  - Would recommend discharging with oral antibiotics   - NWB Left UE in splint, keep C/D/I   - Ice/Elevation  - Signs/sx of compartment syndrome discussed with patient, told to return to ED if exhibit any of these symptoms  - Possible need for surgical intervention in future discussed, all questions answered  - Follow up with Dr. Khanna within 1 week, call office for appointment [phone]   A/P: 53 y/o Female with Left distal radius and ulna fracture w/ pokehole s/p IV Ancef  - Pain control  - Would recommend discharging with PO Keflex  - NWB Left UE in splint, keep C/D/I   - Ice/Elevation  - Signs/sx of compartment syndrome discussed with patient, told to return to ED if exhibit any of these symptoms  - Possible need for surgical intervention in future discussed, all questions answered  - Follow up with Dr. Khanna within 1 week, call office for appointment [phone]  - Ortho stable for DC

## 2023-01-10 NOTE — ED PROVIDER NOTE - PHYSICAL EXAMINATION
GENERAL: Vital signs are within normal limits  EYES: Conjunctiva noninjected or pale, sclera anicteric  HENT: NC/AT, moist mucous membranes  NECK: Supple, trachea midline  LUNG: Nonlabored respirations, no wheezes, rales  CV: RRR, Pulses- Radial/dorsalis pedis: 2+ bilateral and equal  ABDOMEN: Nondistended, nontender  MSK: L wrist with deformity, radial pulse intact, lac over medial side of wrist with what appears to be subcutaneous fat protruding. cap refill intact.   SKIN: No rashes, bruises  NEURO: AAOx4 (to person, place, time, event), no tremor, steady gait  PSYCH: Normal mood and affect

## 2023-01-10 NOTE — ED PROVIDER NOTE - NS ED ROS FT
CONSTITUTIONAL: No fevers, chills, fatigue, dizziness, weakness, unexpected weight change  EYES: No double vision, blurry vision  ENT: No ear pain, nasal congestion, runny nose, sore throat  CV: No chest pain, palpitations  PULM: No cough, shortness of breath  GI: No abdominal pain, nausea, vomiting, diarrhea, constipation  : No dysuria, polyuria, hematuria  SKIN: No rashes, swelling  MSK: L wrist deformity and pain  NEURO: No headache, paresthesias  PSYCHIATRIC: Denies suicidal, homicidal ideations. No auditory, visual, tactile hallucinations

## 2023-01-10 NOTE — ED PROVIDER NOTE - NSICDXPASTSURGICALHX_GEN_ALL_CORE_FT
PAST SURGICAL HISTORY:  H/O breast biopsy left 2004 benign  right 2015 benign    S/P cone biopsy of cervix 1997    S/P vein stripping b/l 2018

## 2023-01-10 NOTE — CONSULT NOTE ADULT - SUBJECTIVE AND OBJECTIVE BOX
Orthopedics Consult Note:  53 y/o RHD Female with no significant PMH presents to [facility] with c/o Left wrist pain s/p mechanical fall down two stairs today. States when she fell she landed on wrist, also endorsing minimal shoulder tenderness. Reports she began having significant pain and deformity of left wrist as well as bleeding. Denies head strike or LOC. Denies numbness/tingling to extremities, or any other pain/injuries. Patient was administered one dose Ancef 2grams IV in ED as well as tetanus vaccine.    PAST MEDICAL HISTORY:  denies    Allergies  adhesives (Other)  latex (Rash)  No Known Drug Allergies      Vital Signs Last 24 Hrs  T(C): 36.4 (01-10-23 @ 12:51), Max: 36.4 (01-10-23 @ 12:51)  T(F): 97.5 (01-10-23 @ 12:51), Max: 97.5 (01-10-23 @ 12:51)  HR: 91 (01-10-23 @ 12:51) (91 - 91)  BP: 142/89 (01-10-23 @ 12:51) (142/89 - 142/89)  BP(mean): --  RR: 16 (01-10-23 @ 12:51) (16 - 16)  SpO2: 100% (01-10-23 @ 12:51) (100% - 100%)    Labs:                        14.1   8.17  )-----------( 291      ( 10 Brenden 2023 14:13 )             41.9     10 Brenden 2023 14:13    136    |  99     |  11     ----------------------------<  92     3.9     |  23     |  0.61     Ca    8.6        10 Brenden 2023 14:13    TPro  8.1    /  Alb  4.8    /  TBili  0.3    /  DBili  x      /  AST  29     /  ALT  22     /  AlkPhos  54     10 Brenden 2023 14:13    PT/INR - ( 10 Brenden 2023 14:13 )   PT: 10.6 sec;   INR: 0.91 ratio         PTT - ( 10 Brenden 2023 14:13 )  PTT:26.9 sec      Physical Exam:  Gen: NAD  RUE:  Small pokehole noted over ulnar aspect of wrist  Deformity and wrist swelling noted    +TTP distal radius/shoulder, able to range shoulder with minimal pain  No elbow tenderness/swelling  AIN/PIN/U motor function intact. Unable to further range wrist 2/2 to pain  Able to wiggle fingers, Sensation intact to light touch  Compartments soft, extremity warm, well-perfused, 2+ radial pulse.     LUE: Moving at baseline shoulder, elbow, wrist, digits. No deformity or swelling. No pain on PROM shoulder, elbow, wrist.        Imaging:   < from: Xray Wrist 3 Views, Left (01.10.23 @ 15:08) >    ACC: [id number] EXAM:  XR WRIST COMP MIN 3 VIEWS LT                        ACC: [id number] EXAM:  XR FOREARM 2 VIEWS LT                        ACC: [id number] EXAM:  XR ELBOW COMP MIN 3V LT                          PROCEDURE DATE:  01/10/2023      TECHNIQUE: 3 views of the left elbow, 2 views of the left forearm and 3   views of the left wrist    COMPARISON: No similar prior comparisons available.    IMPRESSION:  Acute impacted comminuted fracture of the distal radius with dorsal and   radial displacement of the dominant distal fracture fragment. Additional   displaced avulsion fracture of the ulnar styloid.  There is significant soft tissue swelling adjacent to the fracture.  Basilar joint arthrosis. No other advanced arthritic changes.    --- End of Report ---    NORMA RODRIGUEZ MD; Resident Radiologist  This document has been electronically signed.  HECTOR THOMPSON MD; Attending Radiologist  This document has been electronically signed. Brenden 10 2023  3:26PM  < end of copied text >      Procedure: 10cc 1% lidocaine injected under sterile procedure into Left Distal radius fracture site. Time was allowed to achieve anesthetic effect. Hand was hung using IV pole, ruiz and weights. Closed reduction performed. Xeroform and tegaderm placed over poke hole first. Placed in well padded sugar tong splint, molded appropriately. Post-procedure imaging obtained. Post procedure exam unchanged, NV intact, able to wiggles all fingers, SILT distally.          Orthopedics Consult Note:  53 y/o RHD Female with no significant PMH presents to [facility] with c/o Left wrist pain s/p mechanical fall down two stairs today. States when she fell she landed on wrist, also endorsing minimal shoulder tenderness. Reports she began having significant pain and deformity of left wrist as well as bleeding. Denies head strike or LOC. Denies numbness/tingling to extremities, or any other pain/injuries. Patient was administered one dose Ancef 2grams IV in ED as well as tetanus vaccine.    PAST MEDICAL HISTORY:  denies    Allergies  adhesives (Other)  latex (Rash)  No Known Drug Allergies      Vital Signs Last 24 Hrs  T(C): 36.4 (01-10-23 @ 12:51), Max: 36.4 (01-10-23 @ 12:51)  T(F): 97.5 (01-10-23 @ 12:51), Max: 97.5 (01-10-23 @ 12:51)  HR: 91 (01-10-23 @ 12:51) (91 - 91)  BP: 142/89 (01-10-23 @ 12:51) (142/89 - 142/89)  BP(mean): --  RR: 16 (01-10-23 @ 12:51) (16 - 16)  SpO2: 100% (01-10-23 @ 12:51) (100% - 100%)    Labs:                        14.1   8.17  )-----------( 291      ( 10 Brenden 2023 14:13 )             41.9     10 Brenden 2023 14:13    136    |  99     |  11     ----------------------------<  92     3.9     |  23     |  0.61     Ca    8.6        10 Brenden 2023 14:13    TPro  8.1    /  Alb  4.8    /  TBili  0.3    /  DBili  x      /  AST  29     /  ALT  22     /  AlkPhos  54     10 Brenden 2023 14:13    PT/INR - ( 10 Brenden 2023 14:13 )   PT: 10.6 sec;   INR: 0.91 ratio         PTT - ( 10 Brenden 2023 14:13 )  PTT:26.9 sec      Physical Exam:  Gen: NAD  RUE:  Small pokehole noted over ulnar aspect of wrist  Deformity and wrist swelling noted    +TTP distal radius/shoulder, able to range shoulder with minimal pain  No elbow tenderness/swelling  AIN/PIN/U motor function intact. Unable to further range wrist 2/2 to pain  Able to wiggle fingers, Sensation intact to light touch  Compartments soft, extremity warm, well-perfused, 2+ radial pulse.     LUE: Moving at baseline shoulder, elbow, wrist, digits. No deformity or swelling. No pain on PROM shoulder, elbow, wrist.        Imaging:   < from: Xray Wrist 3 Views, Left (01.10.23 @ 15:08) >    ACC: [id number] EXAM:  XR WRIST COMP MIN 3 VIEWS LT                        ACC: [id number] EXAM:  XR FOREARM 2 VIEWS LT                        ACC: [id number] EXAM:  XR ELBOW COMP MIN 3V LT                          PROCEDURE DATE:  01/10/2023      TECHNIQUE: 3 views of the left elbow, 2 views of the left forearm and 3   views of the left wrist    COMPARISON: No similar prior comparisons available.    IMPRESSION:  Acute impacted comminuted fracture of the distal radius with dorsal and   radial displacement of the dominant distal fracture fragment. Additional   displaced avulsion fracture of the ulnar styloid.  There is significant soft tissue swelling adjacent to the fracture.  Basilar joint arthrosis. No other advanced arthritic changes.    --- End of Report ---    NORMA RODRIGUEZ MD; Resident Radiologist  This document has been electronically signed.  HECTOR THOMPSON MD; Attending Radiologist  This document has been electronically signed. Brenden 10 2023  3:26PM  < end of copied text >    Right Shoulder XR: neg      Procedure: 10cc 1% lidocaine injected under sterile procedure into Left Distal radius fracture site. Time was allowed to achieve anesthetic effect. Hand was hung using IV pole, ruiz and weights. Closed reduction performed. Xeroform and tegaderm placed over poke hole first. Placed in well padded sugar tong splint, molded appropriately. Post-procedure imaging obtained. Post procedure exam unchanged, NV intact, able to wiggles all fingers, SILT distally.          Orthopedics Consult Note:  53 y/o RHD Female with no significant PMH presents to [facility] with c/o Left wrist pain s/p mechanical fall down two stairs today. States when she fell she landed on wrist, also endorsing minimal shoulder tenderness. Reports she began having significant pain and deformity of left wrist as well as bleeding. Denies head strike or LOC. Denies numbness/tingling to extremities, or any other pain/injuries. Patient was administered one dose Ancef 2grams IV in ED as well as tetanus vaccine.    PAST MEDICAL HISTORY:  denies    Allergies  adhesives (Other)  latex (Rash)  No Known Drug Allergies      Vital Signs Last 24 Hrs  T(C): 36.4 (01-10-23 @ 12:51), Max: 36.4 (01-10-23 @ 12:51)  T(F): 97.5 (01-10-23 @ 12:51), Max: 97.5 (01-10-23 @ 12:51)  HR: 91 (01-10-23 @ 12:51) (91 - 91)  BP: 142/89 (01-10-23 @ 12:51) (142/89 - 142/89)  BP(mean): --  RR: 16 (01-10-23 @ 12:51) (16 - 16)  SpO2: 100% (01-10-23 @ 12:51) (100% - 100%)    Labs:                        14.1   8.17  )-----------( 291      ( 10 Brenden 2023 14:13 )             41.9     10 Brenden 2023 14:13    136    |  99     |  11     ----------------------------<  92     3.9     |  23     |  0.61     Ca    8.6        10 Brenden 2023 14:13    TPro  8.1    /  Alb  4.8    /  TBili  0.3    /  DBili  x      /  AST  29     /  ALT  22     /  AlkPhos  54     10 Brenden 2023 14:13    PT/INR - ( 10 Brenden 2023 14:13 )   PT: 10.6 sec;   INR: 0.91 ratio         PTT - ( 10 Brenden 2023 14:13 )  PTT:26.9 sec      Physical Exam: patient seen and examined with Dr. Guzman    Gen: EMILIE  RUE:  Small pokehole noted over ulnar aspect of wrist  Deformity and wrist swelling noted    +TTP distal radius/shoulder, able to range shoulder with minimal pain  No elbow tenderness/swelling  AIN/PIN/U motor function intact. Unable to further range wrist 2/2 to pain  Able to wiggle fingers, Sensation intact to light touch  Compartments soft, extremity warm, well-perfused, 2+ radial pulse.     LUE: Moving at baseline shoulder, elbow, wrist, digits. No deformity or swelling. No pain on PROM shoulder, elbow, wrist.        Imaging:   < from: Xray Wrist 3 Views, Left (01.10.23 @ 15:08) >    ACC: [id number] EXAM:  XR WRIST COMP MIN 3 VIEWS LT                        ACC: [id number] EXAM:  XR FOREARM 2 VIEWS LT                        ACC: [id number] EXAM:  XR ELBOW COMP MIN 3V LT                          PROCEDURE DATE:  01/10/2023      TECHNIQUE: 3 views of the left elbow, 2 views of the left forearm and 3   views of the left wrist    COMPARISON: No similar prior comparisons available.    IMPRESSION:  Acute impacted comminuted fracture of the distal radius with dorsal and   radial displacement of the dominant distal fracture fragment. Additional   displaced avulsion fracture of the ulnar styloid.  There is significant soft tissue swelling adjacent to the fracture.  Basilar joint arthrosis. No other advanced arthritic changes.    --- End of Report ---    NORMA RODRIGUEZ MD; Resident Radiologist  This document has been electronically signed.  HECTOR THOMPSON MD; Attending Radiologist  This document has been electronically signed. Brenden 10 2023  3:26PM  < end of copied text >    Right Shoulder XR: neg      Procedure: 10cc 1% lidocaine injected under sterile procedure into Left Distal radius fracture site. Time was allowed to achieve anesthetic effect. Hand was hung using IV pole, ruiz and weights. Closed reduction performed. Xeroform and tegaderm placed over poke hole first. Placed in well padded sugar tong splint, molded appropriately. Post-procedure imaging obtained. Post procedure exam unchanged, NV intact, able to wiggles all fingers, SILT distally.

## 2023-01-11 RX ORDER — ALPRAZOLAM 0.5 MG/1
0.5 TABLET ORAL
Qty: 1 | Refills: 0 | Status: COMPLETED | COMMUNITY
Start: 2023-01-11 | End: 2023-01-20

## 2023-01-12 ENCOUNTER — NON-APPOINTMENT (OUTPATIENT)
Age: 53
End: 2023-01-12

## 2023-01-12 ENCOUNTER — APPOINTMENT (OUTPATIENT)
Dept: ORTHOPEDIC SURGERY | Facility: CLINIC | Age: 53
End: 2023-01-12
Payer: COMMERCIAL

## 2023-01-12 PROCEDURE — 29075 APPL CST ELBW FNGR SHORT ARM: CPT | Mod: LT

## 2023-01-12 PROCEDURE — 73110 X-RAY EXAM OF WRIST: CPT | Mod: LT

## 2023-01-12 PROCEDURE — 99204 OFFICE O/P NEW MOD 45 MIN: CPT | Mod: 25

## 2023-01-12 RX ORDER — IBUPROFEN 800 MG/1
800 TABLET, FILM COATED ORAL EVERY 8 HOURS
Qty: 42 | Refills: 0 | Status: ACTIVE | COMMUNITY
Start: 2023-01-12 | End: 1900-01-01

## 2023-01-12 RX ORDER — ACETAMINOPHEN 500 MG/1
500 TABLET ORAL EVERY 6 HOURS
Qty: 56 | Refills: 0 | Status: ACTIVE | COMMUNITY
Start: 2023-01-12 | End: 1900-01-01

## 2023-01-12 RX ORDER — OXYCODONE 5 MG/1
5 TABLET ORAL
Qty: 10 | Refills: 0 | Status: ACTIVE | COMMUNITY
Start: 2023-01-12 | End: 1900-01-01

## 2023-01-13 ENCOUNTER — NON-APPOINTMENT (OUTPATIENT)
Age: 53
End: 2023-01-13

## 2023-01-16 ENCOUNTER — OUTPATIENT (OUTPATIENT)
Dept: OUTPATIENT SERVICES | Facility: HOSPITAL | Age: 53
LOS: 1 days | End: 2023-01-16
Payer: COMMERCIAL

## 2023-01-16 DIAGNOSIS — Z98.890 OTHER SPECIFIED POSTPROCEDURAL STATES: Chronic | ICD-10-CM

## 2023-01-16 DIAGNOSIS — Z11.52 ENCOUNTER FOR SCREENING FOR COVID-19: ICD-10-CM

## 2023-01-16 LAB — SARS-COV-2 RNA SPEC QL NAA+PROBE: DETECTED

## 2023-01-18 ENCOUNTER — TRANSCRIPTION ENCOUNTER (OUTPATIENT)
Age: 53
End: 2023-01-18

## 2023-01-18 RX ORDER — OXYCODONE 5 MG/1
5 TABLET ORAL
Qty: 30 | Refills: 0 | Status: ACTIVE | COMMUNITY
Start: 2023-01-18 | End: 1900-01-01

## 2023-01-18 RX ORDER — IBUPROFEN 800 MG/1
800 TABLET, FILM COATED ORAL
Qty: 90 | Refills: 0 | Status: ACTIVE | COMMUNITY
Start: 2023-01-18 | End: 1900-01-01

## 2023-01-19 ENCOUNTER — OUTPATIENT (OUTPATIENT)
Dept: OUTPATIENT SERVICES | Facility: HOSPITAL | Age: 53
LOS: 1 days | End: 2023-01-19
Payer: COMMERCIAL

## 2023-01-19 ENCOUNTER — TRANSCRIPTION ENCOUNTER (OUTPATIENT)
Age: 53
End: 2023-01-19

## 2023-01-19 ENCOUNTER — APPOINTMENT (OUTPATIENT)
Dept: ORTHOPEDIC SURGERY | Facility: HOSPITAL | Age: 53
End: 2023-01-19

## 2023-01-19 VITALS
HEIGHT: 68 IN | RESPIRATION RATE: 18 BRPM | OXYGEN SATURATION: 99 % | WEIGHT: 184.97 LBS | SYSTOLIC BLOOD PRESSURE: 182 MMHG | HEART RATE: 99 BPM | TEMPERATURE: 98 F | DIASTOLIC BLOOD PRESSURE: 116 MMHG

## 2023-01-19 VITALS
HEART RATE: 97 BPM | TEMPERATURE: 98 F | RESPIRATION RATE: 18 BRPM | SYSTOLIC BLOOD PRESSURE: 148 MMHG | OXYGEN SATURATION: 98 % | DIASTOLIC BLOOD PRESSURE: 96 MMHG

## 2023-01-19 DIAGNOSIS — S52.572D OTHER INTRAARTICULAR FRACTURE OF LOWER END OF LEFT RADIUS, SUBSEQUENT ENCOUNTER FOR CLOSED FRACTURE WITH ROUTINE HEALING: ICD-10-CM

## 2023-01-19 DIAGNOSIS — Z98.890 OTHER SPECIFIED POSTPROCEDURAL STATES: Chronic | ICD-10-CM

## 2023-01-19 LAB — HCG UR QL: NEGATIVE — SIGNIFICANT CHANGE UP

## 2023-01-19 PROCEDURE — C9399: CPT

## 2023-01-19 PROCEDURE — 76000 FLUOROSCOPY <1 HR PHYS/QHP: CPT

## 2023-01-19 PROCEDURE — U0003: CPT

## 2023-01-19 PROCEDURE — 81025 URINE PREGNANCY TEST: CPT

## 2023-01-19 PROCEDURE — C9803: CPT

## 2023-01-19 PROCEDURE — U0005: CPT

## 2023-01-19 PROCEDURE — C1713: CPT

## 2023-01-19 PROCEDURE — 25609 OPTX DST RD XART FX/EP SEP3+: CPT | Mod: 58,LT

## 2023-01-19 PROCEDURE — 25609 OPTX DST RD XART FX/EP SEP3+: CPT | Mod: LT

## 2023-01-19 PROCEDURE — 25290 INCISE WRIST/FOREARM TENDON: CPT | Mod: 58,LT

## 2023-01-19 DEVICE — PEG THREADED LOKG 2.3X16MM: Type: IMPLANTABLE DEVICE | Site: LEFT | Status: FUNCTIONAL

## 2023-01-19 DEVICE — SCREW CORT NON LOKG 3.5X11MM: Type: IMPLANTABLE DEVICE | Site: LEFT | Status: FUNCTIONAL

## 2023-01-19 DEVICE — SCREW CORT LOCKING 3.5X13MM: Type: IMPLANTABLE DEVICE | Site: LEFT | Status: FUNCTIONAL

## 2023-01-19 DEVICE — IMPLANTABLE DEVICE: Type: IMPLANTABLE DEVICE | Site: LEFT | Status: FUNCTIONAL

## 2023-01-19 DEVICE — KWIRE STD TIP 1.5X127MM: Type: IMPLANTABLE DEVICE | Site: LEFT | Status: FUNCTIONAL

## 2023-01-19 DEVICE — SCREW CORT LOKG 3.5X14MM: Type: IMPLANTABLE DEVICE | Site: LEFT | Status: FUNCTIONAL

## 2023-01-19 DEVICE — PLATE GEMINUS VOLAR DIST RAD NRRW 4H LT: Type: IMPLANTABLE DEVICE | Site: LEFT | Status: FUNCTIONAL

## 2023-01-19 DEVICE — PEG THREADED LOCKING 2.3X17MM: Type: IMPLANTABLE DEVICE | Site: LEFT | Status: FUNCTIONAL

## 2023-01-19 RX ORDER — HYDROMORPHONE HYDROCHLORIDE 2 MG/ML
0.5 INJECTION INTRAMUSCULAR; INTRAVENOUS; SUBCUTANEOUS
Refills: 0 | Status: DISCONTINUED | OUTPATIENT
Start: 2023-01-19 | End: 2023-01-19

## 2023-01-19 RX ORDER — ONDANSETRON 8 MG/1
4 TABLET, FILM COATED ORAL ONCE
Refills: 0 | Status: DISCONTINUED | OUTPATIENT
Start: 2023-01-19 | End: 2023-01-19

## 2023-01-19 RX ORDER — CYCLOBENZAPRINE HYDROCHLORIDE 10 MG/1
1 TABLET, FILM COATED ORAL
Qty: 0 | Refills: 0 | DISCHARGE

## 2023-01-19 RX ORDER — SODIUM CHLORIDE 9 MG/ML
1000 INJECTION, SOLUTION INTRAVENOUS
Refills: 0 | Status: DISCONTINUED | OUTPATIENT
Start: 2023-01-19 | End: 2023-01-19

## 2023-01-19 RX ORDER — INFLUENZA VIRUS VACCINE 15; 15; 15; 15 UG/.5ML; UG/.5ML; UG/.5ML; UG/.5ML
0.5 SUSPENSION INTRAMUSCULAR ONCE
Refills: 0 | Status: DISCONTINUED | OUTPATIENT
Start: 2023-01-19 | End: 2023-02-03

## 2023-01-19 RX ORDER — SODIUM CHLORIDE 9 MG/ML
1000 INJECTION INTRAMUSCULAR; INTRAVENOUS; SUBCUTANEOUS
Refills: 0 | Status: DISCONTINUED | OUTPATIENT
Start: 2023-01-19 | End: 2023-01-19

## 2023-01-19 RX ORDER — ACETAMINOPHEN 500 MG
1000 TABLET ORAL ONCE
Refills: 0 | Status: DISCONTINUED | OUTPATIENT
Start: 2023-01-19 | End: 2023-02-03

## 2023-01-19 RX ORDER — ALPRAZOLAM 0.25 MG
1 TABLET ORAL
Qty: 0 | Refills: 0 | DISCHARGE

## 2023-01-19 RX ORDER — IBUPROFEN 200 MG
1 TABLET ORAL
Qty: 0 | Refills: 0 | DISCHARGE

## 2023-01-19 NOTE — PRE-ANESTHESIA EVALUATION ADULT - NSANTHDIETYNSD_GEN_ALL_CORE
Yes

## 2023-01-19 NOTE — ASU DISCHARGE PLAN (ADULT/PEDIATRIC) - ASU DC SPECIAL INSTRUCTIONSFT
Please follow all instructions given to you by your surgeon and her office  Follow up  in 7-10 days. Call office for appointment.   Keep dressing clean, dry, and intact as instructed.   Take medications as prescribed.   Rest, ice, and elevate affected extremity .

## 2023-01-19 NOTE — H&P ADULT - ASSESSMENT
Assessment and Plan     52y year old Female  with left distal radius fracture. Patient hemodynamically stable.     - OR today  - Pacu then home

## 2023-01-19 NOTE — PATIENT PROFILE ADULT - IS THERE A SUSPICION OF ABUSE/NEGLIGENCE?
Assessment    1  HIV disease (042) (B20)   2  AIDS (042) (B20)   3  Fibromyalgia (729 1) (M79 7)   4  High cholesterol (272 0) (E78 00)   5  Mild intermittent asthma without complication (016 55) (T60 30)   6  Diabetes mellitus, type 2 (250 00) (E11 9)   7  Poor dentition (525 9) (K08 9)   8  Facial rash (782 1) (R21)   9  Encounter for preventive health examination (V70 0) (Z00 00)   10  Rectal abscess (566) (K61 1)   11  Fungal rash of torso (111 9) (B36 9)   12  Nicotine dependence (305 1) (F17 200)   13  Depression (311) (F32 9)   14  Need for pneumocystis prophylaxis (V07 8) (Z29 8)   15   Neuropathy (355 9) (G62 9)    Plan  Diabetes mellitus, type 2    · MetFORMIN HCl - 500 MG Oral Tablet; take 1 tablet by mouth twice a day  Facial rash    · *1 -  CLINIC DERMATOLOGY Co-Management  *  Status: Hold For - Scheduling   Requested for: 26RUL5163  Care Summary provided  : Yes   · Dermatology Referral Other Co-Management  *  Status: Hold For - Scheduling   Requested for: 47CKG7949  are Referring to a non- Preferred Provider : Scheduling access issues  Care Summary provided  : Yes  Fibromyalgia    · Gabapentin 800 MG Oral Tablet; TAKE 1 TABLET 3 TIMES DAILY  Fungal rash of torso    · Clotrimazole 1 % External Cream; Apply to affected skin twice daily  Health Maintenance    · Follow-up visit in 3 months Evaluation and Treatment  Follow-up  Status: Complete   Done: 81RGB1436   · Ophthalmology Follow Up Evaluation and Treatment  Follow-up  Status: Hold For -  Scheduling  Requested for: 73SCB0401  High cholesterol    · Atorvastatin Calcium 40 MG Oral Tablet; TAKE 1 TABLET DAILY AS DIRECTED  HIV disease    · *1 - [facility] Co-Management  *  Status: Hold For -  Scheduling  Requested for: 02OOM1342  Care Summary provided  : Yes   · 1 - Ivan Cabrera RD (Registered Dietitian) Co-Management  *  Status: Hold For -  Scheduling  Requested for: 58IUU3493  Care Summary provided  : Yes   · 1 - Al MD, Heavenly Christensen  (Infectious Disease) Co-Management  HIV care  Status: Hold For -  Scheduling  Requested for: 51SFV5229  Care Summary provided  : Yes   · (1) COMPREHENSIVE METABOLIC PANEL; Status:Active; Requested for:17Nov2017;    · Flulaval Quadrivalent Intramuscular Suspension; INJECT 0 5  ML  Intramuscular; To Be Done: 88GEM2998  PMH: Blurry vision, bilateral, Rectal abscess    · (1) WOUND CULTURE; Status:Complete;   Done: 62SXD3230 07:28PM  Poor dentition    · *1 -  DENTAL CLINIC Co-Management  *  Status: Active  Requested for: 28DGQ0568  Care Summary provided  : Yes  Rectal abscess    · Doxycycline Hyclate 100 MG Oral Capsule; TAKE 1 CAPSULE EVERY 12 HOURS  DAILY    Discussion/Summary    1  HIV/AIDS - Reviewed patient's lab results from November 2017  The patient has remained undetectable since leaving her clinic in [place], but her CD4 has decreased to 182  The patient just received a brand new bottle of Stribild this morning  She will continue 1 tab PO daily  Stressed adherence  Patient will meet with the medical assistant today to schedule an appointment to start care with ID specialist Dr Shay Camacho  2  Need for PCP Prophylaxis - Patient has been taking Bactrim DS 3 times a week  Recommended at this time patient stop the Bactrim  Recently the guidelines have changed to state that patients that have a CD4 between 100 in 200 do not need to be on prophylaxis as long as there undetectable  The patient is undetectable  Also, she may be having some hypersensitivity to the Bactrim which could be contributing to the facial and torso rash  Will have her discuss this further when she meets with Dr Shay Camacho  3  FIbromyalgia - not addressed at today's visit  Will investigate further at patient's next appointments  Records will be requested from patient's previous CRNP at [facility]  4  Neuropathic Pain - Patient reports she has been prescribed gabapentin 800 milligrams to take 3 times a day   However, she reports that she is only taking it twice a day because she does not like to take pills  There is no prescriber evident in the patient's medical records  Informed the patient that she can have 30 day fill on this medication but that we need to receive records from [address] [place] [address] to get her full history  5  High Cholesterol - Patient reports she was previously taking atorvastatin, a 40mg dose  Reviewed patient's lipid panel from November 13, 2017 which did show well controlled cholesterol  Told patient that she can have a 30 day supply of this medication but we need to receive records to get the full history  6  Mild, intermittent asthma - not addressed at today's visit  Will investigate further at patient's next appointments  She is not experiencing any trouble with shortness of breath or wheezing and has not been prescribed a rescue inhaler since she left [facility] in 2016  Records will be requested from patient's previous CRNP at [address] [place] [address]  7  DMII - Patient reports she was prescribed metformin twice a day but she has only been taking it once a day because she does not like taking pills  There is no record of the prescribe her a dose of her metformin but patient recalls it was 500 milligrams  [address] will provide patient with a one month supply, but we must receive more detailed records to prove she is diabetic  The A1c from November 2017 was 6 3% and not confirmatory for the diagnosis of diabetes  8  Poor Dentition - CRNP examined patient's mouth  All have her teeth either have broken feelings or visible caries  She does need to proceed with full extraction and dentures  She is working on getting Regional insurance and will have help from her  in getting a dental appointment once the insurance is active  Referral placed to the [facility] dental clinic  9  Facial Rash - Patient was supposed to be assessed by a dermatologist in December 2017 but has lost that appointment since moving to [place]   She will be re-referred to the Dermatology Clinic at [facility]  She can work with the patient navigator on scheduling this appointment once her insurance is active  Also placed referral to outside dermatology office so that patient can opt to see a different provider if the weight is too long at [facility]  10  Rectal Abscess - CRNP examined patient's buttocks, rectum, and perineal area  She has hyperpigmented skin which is evidence of multiple previous abscesses  The current abscess is located to the right of her rectum in the crease near where her leg in perineum me  The raised area was only 2mm in diameter but has an erythematous ring of approximately 3cm surrounding the area  It is cool to touch  The raised pustule has thin yellow pus emerging  There is a slight odor to the wound  CRNP collected a wound culture which will be sent to the lab and analyzed  She has been prescribed multiple antibiotics in the past but states that none of them have worked  There are no previous wound cultures received in the records from [place]  CRNP will treat once culture results are received  11  Fungal Rash on stomach - CRNP examined small round red flaky patch on patient's right lower abdomen  It is likely fungal in origin  Will have patient apply clotrimazole cream to the spot twice a day  Instructed patient not to use the cream on any of the other rashes that she has as those do not appear fungal     12  Nicotine Dependence - Patient is not interested in making a quit attempt at this time  She will continue to follow closely with the [facility] for routine smoking counseling  13  Depression - stable  Patient is requesting to be connected to a psychiatrist to speak Hebrew  Will have patient meet with the [facility] for her [facility] screening and to further discuss connection to Psychiatry   She will seek medical care immediately for suicidal or homicidal ideation     -Patient is due for [facility] wellness screener, will refer to [facility] [place]  -Patient is due for nutritional screener, will refer to [facility]  -Patient is due for flu shot, will receive today before leaving the clinic  Possible side effects of new medications were reviewed with the patient/guardian today  The treatment plan was reviewed with the patient/guardian  The patient/guardian understands and agrees with the treatment plan   The patient was counseled regarding diagnostic results, instructions for management, risk factor reductions, prognosis, patient and family education, impressions, risks and benefits of treatment options, importance of compliance with treatment  total time of encounter was 75 minutes and 60 minutes was spent counseling  Counseling   Patient reports there are no people in their life who should be tested for HIV  Education   general HIV education  adherence  prevention care  Chief Complaint  Patient here for an initial visit  Patient c/o of a ball/pimple on right inner buttock which started on Sunday and on Wed it started bleeding and puss came out  Patient also c/o a rash that is on her face and upper body   History of Present Illness  The patient presents to [facility] for primary care intake with the [address] at [facility]  The patient moved to the [facility] region 5 weeks ago  She is originally from [facility] and moved to the [address] in July 2016 because she wanted better access to health care  She reports that she moved to [place] to live with her nephew after finding out she has DMII  She states the doctor in [place] is the one who recommended she get an HIV test  She found out that she was HIV positive last summer and was sent for care at [facility]     The patient reports that she was told her HIV was "bad" but then she started meds and was told she's doing "better"  She states that she's prescribed Stribild and likes the Stribild  She denies side effects   She was about to run out of her Stribild and Bactrim but she called her previous provider who agreed to give her a final 30-day supply until she gets settled into our clinic  She reports that she picked up the bottle this morning and will begin in tonight  Therefore she has not missed any doses  The patient reports that she has history of high cholesterol and diabetes  She states she ran our of her Atorvastatin and Metformin  She does not have pill bottles  Her records from [facility] state that she was taking them but no doses or prescribers are listed  The patient states that those meds were coming from her provider at [place], not [facility]  No records have been received from [place]  She also reports using Gabapentin for her fibromyalgia and her neuropathy  The patient does not know if the neuropathy is from her HIV or the DMII  Her main concern is a painful sore near her rectum  She reports that this is one of many painful sores she has had in that area  She states in the past she's had sores in between her buttocks, around the perineum, and within the groin  She states they are both painful and very itchy  When she wipes she feels "raw skin" and believes the current sore started draining on Sunday  She reports the drainage is bloody with pus  She also reports a rash on her face and parts of her torso  She states it is bumpy and itches her at times  She reports that her medical team in [place] had a dermatology appointment scheduled for her in December but since she moved here that appointment was cancelled  She also reports that she was supposed to have her teeth pulled out on December 2, 2017 so that she can be fit for dentures  She states that appointment was also cancelled because she moved  Pain Assessment   the patient states they have pain  Abuse And Domestic Violence Screen    Yes, the patient is safe at home  The patient states no one is hurting them  Depression And Suicide Screen  No, the patient has not had thoughts of hurting themself  Yes, the patient has felt depressed in the past 7 days  The patient is being seen for an initial evaluation of an existing diagnosis of HIV infection  (Stribild) The patient is currently asymptomatic  No associated symptoms are reported  The patient is not sexually active  Family history:  diabetes, dyslipidemia and cardiovascular disease  Pertinent social history:  stable housing situation, but no current employment and no adequate support network  (HLA  - negative on 11/16/16 per medical records  Patient has resistance to Viracept and Invirase on genotype from 11/17/16)     CD4: 182  VL: <20  Time spent: 5 minutes  Strength: no side effects  Weakness: depression  Plan of Action: patient will meet with [facility], patient is requesting connection to psychiatry  SUBSTANCE ABUSE: ETOH use  amount: occassioally  not using drugs  SMOKING: She is a current smoker, uses cigarettes, 1 pk every three days packs per day, for since 6 yr old years and has not thought about quitting  She has the following barriers: she tried it in the past     SEXUALLY ACTIVE: She is not sexually active  HOUSING: She has stable housing  There are 2 people living in the household (including children)  The household income is $922 00 ss  HEALTH MAINTENANCE: Her last dental exam was 8/1/2017  Her last eye exam was 11/1/2016  Her last pap smear was 9/1/2016  Her last mammogram was 9/1/2016  Review of Systems    Constitutional: No fever, no chills, feels well, no tiredness, no recent weight gain or weight loss  The patient presents with complaints of eyesight problems, described as blurry vision Symptoms are improved by wearing glasses (patient wears glasses, prescription last adjusted in 11/2016)  ENT: no complaints of earache, no loss of hearing, no nose bleeds, no nasal discharge, no sore throat, no hoarseness     Cardiovascular: No complaints of slow heart rate, no fast heart rate, no chest pain, no palpitations, no leg claudication, no lower extremity edema  Respiratory: No complaints of shortness of breath, no wheezing, no cough, no SOB on exertion, no orthopnea, no PND  Gastrointestinal: hyperactive bowel sounds  Genitourinary: No complaints of dysuria, no incontinence, no pelvic pain, no dysmenorrhea, no vaginal discharge or bleeding  Musculoskeletal: No complaints of arthralgias, no myalgias, no joint swelling or stiffness, no limb pain or swelling  Integumentary: itching, but as noted in HPI    The patient presents with complaints of gradual onset of mild bilateral truncal area and bilateral arm a rash (R face cheek, lateral ear/occipital area of the neck  Patient feels "little pimples" when she runs her hand over the area)  The patient presents with complaints of sudden onset of moderate right buttock skin lesion (near fold next to the rectum)   Neurological: numbness, tingling and B/L lower extremities  Psychiatric: sleep disturbances and depression, but not suicidal  feelings of weakness   Hematologic/Lymphatic: No complaints of swollen glands, no swollen glands in the neck, does not bleed easily, does not bruise easily  ROS reviewed  Past Medical History    1  History of Blurry vision, bilateral (368 8) (H53 8)   2  History of Facial abscess (682 0) (L02 01)   3  History of abscess of skin and subcutaneous tissue (V13 3) (Z87 2)   4  History of cellulitis (V13 3) (Z87 2)    The active problems and past medical history were reviewed and updated today  Surgical History    1  History of Breast Surgery Lumpectomy   2  History of Hysterectomy   3  History of Perirectal Abscess I&D   4  History of Tubal Ligation    The surgical history was reviewed and updated today  Family History  Mother    1  Family history of cardiac disorder (V17 49) (Z82 49)   2  Family history of diabetes mellitus (V18 0) (Z83 3)   3  Family history of hypertension (V17 49) (Z82 49)   4   Family history of Type 1 diabetes mellitus with hypoglycemic coma  Sister    5  Family history of malignant neoplasm (V16 9) (Z80 9)    The family history was reviewed and updated today  Social History    · Current every day smoker (305 1) (F17 200)  The social history was reviewed and updated today  The social history was reviewed and is unchanged  Current Meds   1  Atorvastatin Calcium 40 MG Oral Tablet; Therapy: 10KDU0804 to Recorded   2  Bactrim -160 MG Oral Tablet; Therapy: 04AXD8538 to Recorded   3  Gabapentin 800 MG Oral Tablet; TAKE 1 TABLET 3 TIMES DAILY; Therapy: 10MMS9560 to (Evaluate:70Mtq1237) Recorded   4  MetFORMIN HCl - 500 MG Oral Tablet; take 1 tablet by mouth twice a day; Therapy: 88HOI3266 to (Evaluate:17Wbt4408) Recorded   5  Stribild 320-627-027-300 MG Oral Tablet; take 1 tablet by mouth daily; Therapy: 10DWH2823 to (Evaluate:90Dzp7962) Recorded    Allergies    1  No Known Drug Allergies    2  Mold    Vitals  Signs   Recorded: 30YNZ9459 12:05PM   Temperature: 98 3 F  Heart Rate: 87  Systolic: 151  Diastolic: 70  Height: 5 ft 4 in  Weight: 165 lb 5 50 oz  BMI Calculated: 28 38  BSA Calculated: 1 8  O2 Saturation: 98    Physical Exam    Constitutional   General appearance: No acute distress, well appearing and well nourished  Eyes   Conjunctiva and lids: No swelling, erythema or discharge  Pupils and irises: Equal, round and reactive to light  Ears, Nose, Mouth, and Throat   Oropharynx: Abnormal   (poor dentition - all teeth have broken fillings and/or visible caries)   Pulmonary   Respiratory effort: No increased work of breathing or signs of respiratory distress  Auscultation of lungs: Abnormal   (slightly coarse on inspiration in the SAKSHI)   Cardiovascular   Auscultation of heart: Normal rate and rhythm, normal S1 and S2, without murmurs  Examination of extremities for edema and/or varicosities: Normal     Abdomen   Abdomen: Non-tender, no masses      Liver and spleen: No hepatomegaly or splenomegaly  Musculoskeletal   Gait and station: Normal     Skin   Examination of the skin for lesions: Abnormal   A single 2 mm pustule(s) R buttock at fold near the rectum  Described as fluctuant  (discharge is thin yellow pus with slight foul odor)   Neurologic   Cranial nerves: Cranial nerves 2-12 intact  Psychiatric   Orientation to person, place, and time: Normal     Mood and affect: Normal        Results/Data  (1) WOUND CULTURE [id number] 07:28PM Luana Burroughs    Order Number: [id number]     Test Name Result Flag Reference   CLINICAL REPORT (Report)     Test:        Wound culture and Gram stain  Specimen Type: Wound  Specimen Date:   11/17/2017 7:28 PM  Result Date:    11/20/2017 9:42 AM  Result Status:   Final result  Resulting Lab:   [place] [address]            Tel: [phone]      CULTURE                                       ------------------                                   Few Colonies of      *** Mixed Skin Hilary ***      STAIN                                        ------------------                                   1+ Polys    1+ Gram negative rods    1+ Gram positive cocci in pairs     (1) CBC/PLT/DIFF [id number] 08:24AM Vicente Melendez     Test Name Result Flag Reference   WBC COUNT 6 18 Thousand/uL  4 31-10 16   RBC COUNT 4 31 Million/uL  3 81-5 12   HEMOGLOBIN 14 0 g/dL  11 5-15 4   HEMATOCRIT 42 8 %  34 8-46  1   MCV 99 fL H 82-98   MCH 32 5 pg  26 8-34 3   MCHC 32 7 g/dL  31 4-37 4   RDW 13 9 %  11 6-15 1   MPV 10 9 fL  8 9-12 7   PLATELET COUNT 070 Thousands/uL  149-390   nRBC AUTOMATED 0 /100 WBCs     NEUTROPHILS RELATIVE PERCENT 70 %  43-75   LYMPHOCYTES RELATIVE PERCENT 22 %  14-44   MONOCYTES RELATIVE PERCENT 6 %  4-12   EOSINOPHILS RELATIVE PERCENT 2 %  0-6   BASOPHILS RELATIVE PERCENT 0 %  0-1   NEUTROPHILS ABSOLUTE COUNT 4 26 Thousands/? ??L  1 85-7 62   LYMPHOCYTES ABSOLUTE COUNT 1 37 Thousands/? ??L  0 60-4 47   MONOCYTES ABSOLUTE COUNT 0 38 Thousand/? ??L  0 17-1 22   EOSINOPHILS ABSOLUTE COUNT 0 14 Thousand/? ??L  0 00-0 61   BASOPHILS ABSOLUTE COUNT 0 02 Thousands/? ??L  0 00-0 10   This is a patient instruction: This test is non-fasting  Please drink two glasses of water morning of bloodwork  (1) LIPID PANEL, FASTING [id number] 08:24AM Vicente Melendez     Test Name Result Flag Reference   CHOLESTEROL 164 mg/dL     HDL,DIRECT 51 mg/dL  40-60   Specimen collection should occur prior to Metamizole administration due to the potential for falsley depressed results  LDL CHOLESTEROL CALCULATED 81 mg/dL  0-100   This is a patient instruction: This is a fasting test  Water, black tea or black coffee only after 9:00pm the night before the test  Drink 2 glasses of water the morning of the test         Triglyceride:        Normal <150 mg/dl   Borderline High 150-199 mg/dl   High 200-499 mg/dl   Very High >499 mg/dl      Cholesterol:       Desirable <200 mg/dl    Borderline High 200-239 mg/dl    High >239 mg/dl      HDL Cholesterol:       High>59 mg/dL    Low <41 mg/dL      This screening LDL is a calculated result  It does not have the accuracy of the Direct Measured LDL in the monitoring of patients with hyperlipidemia and/or statin therapy  Direct Measure LDL (NAS037) must be ordered separately in these patients  TRIGLYCERIDES 158 mg/dL H <=150   Specimen collection should occur prior to N-Acetylcysteine or Metamizole administration due to the potential for falsely depressed results       (1) T LYMPH SUBSET (CD4) [id number] 08:24AM Angelita Melendez     Test Name Result Flag Reference   CD4 T CELL ABSOLUTE 182 /uL L 359 - 1519   CD4 % HELPER T CELL 15 2 % L 30 8 - 58 5   WBC (CD4/8) 5 4 x10E3/uL  3 4 - 10 8   RBC 4 34 x10E6/uL  3 77 - 5 28   HGB (CD4/8) 13 9 g/dL  11 1 - 15 9   HCT (CD4/8) 41 3 %  34 0 - 46 6   MCV (CD4/8) 95 fL  79 - 97   MCH (CD4/8) 32 0 pg  26 6 - 33 0   MCHC (CD4/8) 33 7 g/dL  31 5 - 35 7   RDW (CD4/8) 14 1 %  12 3 - 15 4   PLT (CD4/8) 290 x10E3/uL  150 - 379   NEUTS (CD4/8) 70 %  Not Estab  LYMPHS (CD4/8) 21 %  Not Estab  MONOS (CD4/8) 7 %  Not Estab  EOS (CD4/8) 2 %  Not Estab  BASOS (CD4/8) 0 %  Not Estab  NEUTS,ABS  (CD4/8) 3 7 x10E3/uL  1 4 - 7 0   LYMP,ABS (CD4/8) 1 2 x10E3/uL  0 7 - 3 1   MONOS,ABS (CD4/8) 0 4 x10E3/uL  0 1 - 0 9   EOS, ABS (CD4/8) 0 1 x10E3/uL  0 0 - 0 4   BASO, ABS (CD4/8) 0 0 x10E3/uL  0 0 - 0 2   IIMM  GRANS,ABS (CD4/8) 0 0 x10E3/uL  0 0 - 0 1   IMM  GRANULOCYTES (CD4/8) 0 %  Not Estab  Performed at:  [facility] [address]  [id number]  : Tal Connelly MD, Phone:  [phone]     (1) HIV-1 RNA QUANTITATIVE [id number] 08:24AM Leonid Anders     Test Name Result Flag Reference   HIV-1 RNA QUANT <20 copies/mL     HIV-1 RNA not detected  The reportable range for this assay is 20 to 10,000,000  copies HIV-1 RNA/mL  HIV-1 RNA VIRAL LOAD LOG COMMENT [id number]/mL     Unable to calculate result since non-numeric result obtained for  component test   Performed at:  [facility]  [address]  [id number]  : Tal Connelly MD, Phone:  [phone]     Attending Note  Collaborating Physician: I agree with the Advanced Practitioner note        Future Appointments    Date/Time Provider Specialty Site   01/03/2018 04:45 PM Leonid Anders MD Infectious Disease [facility] AT [place]   02/23/2018 11:30 AM Kenya Mandujano, [address] [facility] AT [address] [facility]   Electronically signed by : Leonel Dumont; Nov 17 2017  4:48PM EST                       (Author)    Electronically signed by : Susie Bai DO; Nov 26 2017  4:49PM EST                       (Author) no

## 2023-01-19 NOTE — H&P ADULT - HISTORY OF PRESENT ILLNESS
HPI:   52y year old Female presents for surgical fixation of a left distal radius fracture. She was seen in the office by Dr. Khanna and was indicated for surgery which she agreed to. No complaints today    PMH/PSH:  PAST MEDICAL & SURGICAL HISTORY:  Uterine fibroid  Anxiety  xanax rare occasion      Gluteal pain  sees a chiropractor      History of tinea  on bk since agwe 13      H/O breast biopsy  left 2004 benign  right 2015 benign      S/P vein stripping  b/l 2018      S/P cone biopsy of cervix  1997          Allergies:  adhesives (Other)  latex (Rash)  No Known Drug Allergies      O:  T(C): 36.6 (01-19-23 @ 16:15), Max: 36.6 (01-19-23 @ 16:15)  HR: 99 (01-19-23 @ 16:15) (99 - 99)  BP: 182/116 (01-19-23 @ 16:15) (182/116 - 182/116)  RR: 18 (01-19-23 @ 16:15) (18 - 18)  SpO2: 99% (01-19-23 @ 16:15) (99% - 99%)    Gen: NAD  Resp: Non-labored breathing  RUE  in short arm cast

## 2023-01-19 NOTE — PRE-ANESTHESIA EVALUATION ADULT - NSANTHPMHFT_GEN_ALL_CORE
52y year old Female presents for surgical fixation of a left distal radius fracture. She was seen in the office by Dr. Khanna and was indicated for surgery which she agreed to. No complaints today

## 2023-01-19 NOTE — PATIENT PROFILE ADULT - FALL HARM RISK - RISK INTERVENTIONS

## 2023-01-19 NOTE — ASU DISCHARGE PLAN (ADULT/PEDIATRIC) - NS MD DC FALL RISK RISK
For information on Fall & Injury Prevention, visit: [email or web address] OR  [email or web address] OR  https://www.cdc.gov/steadi/patient.html

## 2023-01-20 ENCOUNTER — APPOINTMENT (OUTPATIENT)
Dept: VASCULAR SURGERY | Facility: CLINIC | Age: 53
End: 2023-01-20

## 2023-01-30 ENCOUNTER — APPOINTMENT (OUTPATIENT)
Dept: ORTHOPEDIC SURGERY | Facility: CLINIC | Age: 53
End: 2023-01-30
Payer: COMMERCIAL

## 2023-01-30 PROCEDURE — 73110 X-RAY EXAM OF WRIST: CPT | Mod: LT

## 2023-01-30 PROCEDURE — 99024 POSTOP FOLLOW-UP VISIT: CPT

## 2023-02-03 ENCOUNTER — APPOINTMENT (OUTPATIENT)
Dept: VASCULAR SURGERY | Facility: CLINIC | Age: 53
End: 2023-02-03
Payer: COMMERCIAL

## 2023-02-03 PROCEDURE — 37765 STAB PHLEB VEINS XTR 10-20: CPT | Mod: RT

## 2023-02-03 NOTE — PROCEDURE
[FreeTextEntry1] : right stab phlebectomy [FreeTextEntry3] : Procedural safety checklist and time out completed:\par Confirmed patient identification (Patient Name, , and/or medical record number including when possible affirmation by patient or parent/family/other.\par Confirmed procedure with the patient. Consent present, accurate and signed. \par Confirmed special equipment and supplies are present.\par Sterility confirmed. Position verified. \par Site/ side is marked and visible and confirmed. \par Procedure confirmed by consent. Accurate consent including side and site.\par Review of medical records noting correct procedure including site and side.\par MD/PA verifies presence and review of imaging studies and or written report of imaging studies.\par Specify equipment are available for the planned procedure.\par MD/PA has marked the patient's procedural site and side.\par Agreement on the procedure to be performed\par Time out completed.\par All of the above has been confirmed by the team.\par All patient-specific concerns have been addressed. \par \par Indication:  right lower extremity varicose veins with inflammation, leg pain, leg swelling, and leg cramping.  \par \par Procedure: Stab phlebectomy right lower extremity\par \par  Ms. VICKY HARRIS is a 52 year old F with a history of symptomatic right lower extremity varicose veins. A trial of compression stockings, exercise, elevation, and pain medication was attempted without relief and definitive treatment with microphlebectomy was offered. \par \par I have discussed the risks of the procedure at length with the patient. The risks discussed were inclusive of but not limited to infection, irritation at the site of infiltration of local anesthesia, and also rare risk of deep venous thrombosis and pulmonary emboli. The patient agrees to proceed with the procedure. \par The patient was escorted into the procedure room, the varicose veins for treatment were marked out and the patient placed on the examination table. The entire limb was prepped and draped in sterile fashion and a  time out was called. \par \par Local anesthesia using 30 cc 1% lidocaine was infiltrated using a 25 gauge needle over the previously marked prominent varicose vein sites.\par Multiple small stab incisions, each less than 1 cm in length was made at the noted sites. With the help of a vein hook, the vein was fished out at each of these sites, rolled over a narrow-tipped mosquito clamp and removed. Hemostasis was secured with leg elevation and application of manual pressure. After assuring hemostasis, a sterile 4x4 was placed on the access sites and an ACE compression wrap was applied. Estimated blood loss: minimal. Patient tolerated procedure well. Patient was given post-procedure instructions and follow up appointment was scheduled. \par \par SIDE - RIGHT \par SITE - CALF / THIGH\par LOCATION - MEDIAL / LATERAL / ANTERIOR / POSTERIOR	\par Total Stab Incisions 20\par \par

## 2023-02-10 ENCOUNTER — APPOINTMENT (OUTPATIENT)
Dept: ORTHOPEDIC SURGERY | Facility: CLINIC | Age: 53
End: 2023-02-10
Payer: COMMERCIAL

## 2023-02-10 PROCEDURE — 73110 X-RAY EXAM OF WRIST: CPT | Mod: LT

## 2023-02-10 PROCEDURE — 99024 POSTOP FOLLOW-UP VISIT: CPT

## 2023-02-22 ENCOUNTER — APPOINTMENT (OUTPATIENT)
Dept: ORTHOPEDIC SURGERY | Facility: CLINIC | Age: 53
End: 2023-02-22
Payer: COMMERCIAL

## 2023-02-22 PROCEDURE — 73110 X-RAY EXAM OF WRIST: CPT | Mod: LT

## 2023-02-22 PROCEDURE — 99024 POSTOP FOLLOW-UP VISIT: CPT

## 2023-02-24 ENCOUNTER — APPOINTMENT (OUTPATIENT)
Dept: VASCULAR SURGERY | Facility: CLINIC | Age: 53
End: 2023-02-24

## 2023-03-03 ENCOUNTER — APPOINTMENT (OUTPATIENT)
Dept: VASCULAR SURGERY | Facility: CLINIC | Age: 53
End: 2023-03-03
Payer: COMMERCIAL

## 2023-03-03 PROCEDURE — 99441: CPT

## 2023-03-03 NOTE — REASON FOR VISIT
[Home] : at home, [unfilled] , at the time of the visit. [Medical Office: ([facility])___] : at the medical office located in  [Verbal consent obtained from patient] : the patient, [unfilled]

## 2023-03-03 NOTE — HISTORY OF PRESENT ILLNESS
[FreeTextEntry1] : \par \par VICKY HARRIS (: 1970) is a 52 year old female with history of venous insufficiency. She is s/p vein procedure(s): bilateral stab phlebectomy. \par \par Today the patient reports improvement in symptoms. Denies fever, chill, pain, swelling. Reports that all incision are well healed and without any issues. Reports some mild bruising in the RLE. \par \par A/P: Patient is doing well s/p vein procedures. She has an upcoming appointment for sclerotherapy which she wants to reschedule because it conflicts with her physical therapy. Will have [facility] reach out the patient to reschedule vein treatment. \par

## 2023-03-06 ENCOUNTER — APPOINTMENT (OUTPATIENT)
Dept: ORTHOPEDIC SURGERY | Facility: CLINIC | Age: 53
End: 2023-03-06
Payer: COMMERCIAL

## 2023-03-06 PROCEDURE — 99024 POSTOP FOLLOW-UP VISIT: CPT

## 2023-03-06 PROCEDURE — 73110 X-RAY EXAM OF WRIST: CPT | Mod: LT

## 2023-03-09 RX ORDER — SULFAMETHOXAZOLE AND TRIMETHOPRIM 800; 160 MG/1; MG/1
800-160 TABLET ORAL TWICE DAILY
Qty: 14 | Refills: 0 | Status: ACTIVE | COMMUNITY
Start: 2023-02-22 | End: 1900-01-01

## 2023-03-10 ENCOUNTER — APPOINTMENT (OUTPATIENT)
Dept: VASCULAR SURGERY | Facility: CLINIC | Age: 53
End: 2023-03-10
Payer: SELF-PAY

## 2023-03-10 DIAGNOSIS — I83.893 VARICOSE VEINS OF BILATERAL LOWER EXTREMITIES WITH OTHER COMPLICATIONS: ICD-10-CM

## 2023-03-10 PROCEDURE — 36468 NJX SCLRSNT SPIDER VEINS: CPT

## 2023-03-10 NOTE — PROCEDURE
[FreeTextEntry1] : bilateral sclerotherapy [FreeTextEntry3] : Procedural safety checklist and time out completed:\par Confirmed patient identification (Patient Name, , and/or medical record number including when possible affirmation by patient or parent/family/other).\par Confirmed procedure with the patient. Consent present, accurate and signed. \par Confirmed special equipment and supplies are present.\par Sterility confirmed. Position verified. \par Site/ side is marked and visible and confirmed. \par Procedure confirmed by consent. Accurate consent including side and site.\par Review of medical records, including venous ultrasound, noting correct procedure including site and side.\par MD/PA verifies presence and review of imaging studies and or written report of imaging studies.\par Agreement on the procedure to be performed\par Time out completed.\par All of the above has been confirmed by the team.\par All patient-specific concerns have been addressed. \par \par Indication:  Bilateral lower extremity spider veins.\par \par Procedure: Bilateral lower extremity sclerotherapy. \par \par Procedure Note:  Ms. VICKY HARRIS is a 52 year old F with bilateral lower extremity spider and branch veins. \par \par I have discussed the risks of the procedure with the patient. Detailed discussion was held with the patient. Risks of itching, superficial thrombophlebitis, hyperpigmentation (darkening of the skin), allergic reactions, skin irritation due to extravasation of the sclerosant, air-embolism, and in rare cases deep vein thrombosis were all discussed with the patient.  Reviewed with patient that sclerotherapy is the injection of a sterile agent (polidocanol) into the spider and small branch varicose veins. It works by damaging the inner wall of the vein. Eventually, the vein collapses on itself and over time, the damaged vein is replaced with fibrous connective tissue and prevents blood flow through the vessel. Sclerotherapy does not prevent the development of new veins. Before the treatment, photos may be obtained.\par The patient agrees to the procedure. The patient was escorted into the procedure room, placed on the procedure table and a time out was called. The legs for sclerotherapy treatment were cleaned with 70% isopropyl alcohol. \par \par Sclerosant used was 1.0 % polidocanol (Asclera). Each session consists of a total dose of  4 mL of 1.0 % Asclera (polidocanol) which is directly injected using a 30 gauge needle into the superficial spider veins and small branch veins. \par \par 1st session today 3/10/2023 The following areas were injected - Bilateral posterior calves, left lateral calf, medial and anterior thigh. (Foam)\par \par 1.0 % Asclera  lot# [id number], expiration  2024\par \par Dry gauze was applied to the treated areas of the leg and a compression bandage was applied. Patient instructed to wear the bandage for 72 hours and then wear 20-30mmHg compression stockings daily for minimum of 2 weeks. Patient may remove compression bandage after 24 hrs, shower and don compression stockings for continuous compression x 72 hrs. Patient tolerated the procedure well. A follow-up appt is scheduled for the patient and instructions provided. \par  \par \par \par

## 2023-03-27 ENCOUNTER — APPOINTMENT (OUTPATIENT)
Dept: ORTHOPEDIC SURGERY | Facility: CLINIC | Age: 53
End: 2023-03-27
Payer: COMMERCIAL

## 2023-03-27 PROCEDURE — 99024 POSTOP FOLLOW-UP VISIT: CPT

## 2023-03-27 PROCEDURE — 73110 X-RAY EXAM OF WRIST: CPT | Mod: LT

## 2023-03-29 ENCOUNTER — APPOINTMENT (OUTPATIENT)
Dept: VASCULAR SURGERY | Facility: CLINIC | Age: 53
End: 2023-03-29
Payer: COMMERCIAL

## 2023-03-29 PROCEDURE — 99212 OFFICE O/P EST SF 10 MIN: CPT | Mod: NC

## 2023-03-29 NOTE — ASSESSMENT
[FreeTextEntry1] : Ms. VICKY HARRIS is a 52 year who presents to the office for follow-up evaluation of her spider and branch veins. She is s/p bilateral phlebectomy with Dr Alegria (LLE - Dec and Jan, RLE - Feb) and bilateral sclerotherapy by me on 3/10/2023.\par \par Ms. HARRIS is doing well.  Ms. HARRIS  has been compliant with compression stocking use.  She is still recovering from a left wrist fracture s/p ORIF and is undergoing PT and has been on and off antibiotics. Overall the patient is pleased with the result. Follow up in one month.\par \par

## 2023-03-29 NOTE — PHYSICAL EXAM
[de-identified] : well appearing pleasant female in NAd [FreeTextEntry1] : Fading of the injected spider and branch veins noted. \par Residual veins of the left medial thigh and calf, anterior thigh and posterior calf were reinjected with good result. \par The right posterior calf spider veins were also reinjected with good result. \par Thrombosed veins noted on the lateral and posterior calf which were I&D'd using an 18g needle with good result.\par No hyperpigmentation. No matting. No skin breakdown or ulcers. \par \par \par \par

## 2023-03-29 NOTE — HISTORY OF PRESENT ILLNESS
[FreeTextEntry1] : Ms. VICKY HARRIS is a 52 year who presents to the office for follow-up evaluation of her spider and branch veins. She is s/p bilateral phlebectomy with Dr Alegria (LLE - Dec and Jan, RLE - Feb) and bilateral sclerotherapy by me on 3/10/2023.\par \par Ms. HARRIS is doing well. She denies skin reaction to the sclerosant, such as, burning, redness, skin breakdown. Ms. HARRIS reports a decrease in the amount of spider veins visualized. She reports the veins appeared to initially have a inflamed red scratchy appearance which has since resolved and notes reddish discoloration of the previous "purplish" veins.  Ms. HARRIS  has been compliant with compression stocking use. Patient denies fever or chills after the injections. She is still recovering from a left wrist fracture s/p ORIF and is undergoing PT and has been on and off antibiotics. Overall the patient is pleased with the result.\par \par  [de-identified] : 12/16/2022 LLE phlebectomy with Dr Alegria\par 1/6/2023 LLE phlebectomy with Dr Alegria\par 2/3/2023 RLE phlebectomy with Dr Alegria\par 3/10/2023 Bilateral sclerotherapy of the posterior calves, left lateral calf, medial and anterior thigh. (Foam)\par

## 2023-04-21 ENCOUNTER — APPOINTMENT (OUTPATIENT)
Dept: VASCULAR SURGERY | Facility: CLINIC | Age: 53
End: 2023-04-21
Payer: COMMERCIAL

## 2023-04-21 PROCEDURE — 99212 OFFICE O/P EST SF 10 MIN: CPT

## 2023-04-21 NOTE — HISTORY OF PRESENT ILLNESS
[FreeTextEntry1] : Ms. VICKY HARRIS is a 52 year who presents to the office for follow-up evaluation of her spider and branch veins. She is s/p bilateral phlebectomy with Dr Alegria (LLE - Dec and Jan, RLE - Feb) and bilateral sclerotherapy by me on 3/10/2023.\par \par Ms. HARRIS is doing well. She reports a decrease in the amount of spider veins visualized. Ms. HARRIS  has been compliant with compression stocking use. She is still recovering from a left wrist fracture s/p ORIF and is undergoing PT and has been on and off antibiotics. Overall the patient is pleased with the result.\par \par  [de-identified] : 12/16/2022 LLE phlebectomy with Dr Alegria\par 1/6/2023 LLE phlebectomy with Dr Alegria\par 2/3/2023 RLE phlebectomy with Dr Alegria\par 3/10/2023 Bilateral sclerotherapy of the posterior calves, left lateral calf, medial and anterior thigh. (Foam)\par

## 2023-04-21 NOTE — PHYSICAL EXAM
[Ankle Swelling (On Exam)] : not present [Varicose Veins Of Lower Extremities] : not present [] : not present [No Rash or Lesion] : No rash or lesion [Skin Ulcer] : no ulcer [Skin Induration] : no induration [Alert] : alert [Oriented to Person] : oriented to person [Oriented to Place] : oriented to place [Oriented to Time] : oriented to time [Calm] : calm [de-identified] : well appearing pleasant female in NAD [de-identified] : unlabored [FreeTextEntry1] : Fading of the injected spider and branch veins noted. No thrombosed veins.\par No skin breakdown or ulcers. Scattered spider and reticular veins bilaterally. No edema. Photos taken\par \par \par \par

## 2023-04-21 NOTE — ASSESSMENT
[FreeTextEntry1] : Ms. VICKY HARRIS is a 52 year who presents to the office for follow-up evaluation of her spider and branch veins. She is s/p bilateral phlebectomy with Dr Alegria (LLE - Dec and Jan, RLE - Feb) and bilateral sclerotherapy by me on 3/10/2023.\par \par Ms. HARRIS is doing well.  Ms. HARRIS  has been compliant with compression stocking use.  She is still recovering from a left wrist fracture s/p ORIF and is undergoing PT and has been on and off antibiotics. Overall the patient is pleased with the result. Follow up prn.\par \par

## 2023-04-24 ENCOUNTER — APPOINTMENT (OUTPATIENT)
Dept: ORTHOPEDIC SURGERY | Facility: CLINIC | Age: 53
End: 2023-04-24
Payer: COMMERCIAL

## 2023-04-24 DIAGNOSIS — S52.572D OTHER INTRAARTICULAR FRACTURE OF LOWER END OF LEFT RADIUS, SUBSEQUENT ENCOUNTER FOR CLOSED FRACTURE WITH ROUTINE HEALING: ICD-10-CM

## 2023-04-24 PROCEDURE — 99214 OFFICE O/P EST MOD 30 MIN: CPT | Mod: 25,57

## 2023-04-24 PROCEDURE — 73110 X-RAY EXAM OF WRIST: CPT | Mod: LT

## 2023-10-17 ENCOUNTER — APPOINTMENT (OUTPATIENT)
Dept: INTERNAL MEDICINE | Facility: CLINIC | Age: 53
End: 2023-10-17
Payer: COMMERCIAL

## 2023-10-17 ENCOUNTER — NON-APPOINTMENT (OUTPATIENT)
Age: 53
End: 2023-10-17

## 2023-10-17 VITALS
WEIGHT: 185 LBS | HEART RATE: 70 BPM | OXYGEN SATURATION: 98 % | HEIGHT: 68 IN | RESPIRATION RATE: 17 BRPM | BODY MASS INDEX: 28.04 KG/M2 | DIASTOLIC BLOOD PRESSURE: 85 MMHG | SYSTOLIC BLOOD PRESSURE: 138 MMHG | TEMPERATURE: 98 F

## 2023-10-17 DIAGNOSIS — Z00.00 ENCOUNTER FOR GENERAL ADULT MEDICAL EXAMINATION W/OUT ABNORMAL FINDINGS: ICD-10-CM

## 2023-10-17 DIAGNOSIS — R20.0 ANESTHESIA OF SKIN: ICD-10-CM

## 2023-10-17 DIAGNOSIS — Z81.8 FAMILY HISTORY OF OTHER MENTAL AND BEHAVIORAL DISORDERS: ICD-10-CM

## 2023-10-17 PROCEDURE — 99386 PREV VISIT NEW AGE 40-64: CPT

## 2023-10-18 PROBLEM — R20.0 NUMBNESS OF RIGHT HAND: Status: ACTIVE | Noted: 2023-10-18

## 2023-10-20 LAB
25(OH)D3 SERPL-MCNC: 62.2 NG/ML
ALBUMIN SERPL ELPH-MCNC: 4.9 G/DL
ALP BLD-CCNC: 67 U/L
ALT SERPL-CCNC: 41 U/L
ANION GAP SERPL CALC-SCNC: 13 MMOL/L
APPEARANCE: CLEAR
AST SERPL-CCNC: 41 U/L
BILIRUB SERPL-MCNC: 0.6 MG/DL
BILIRUBIN URINE: NEGATIVE
BLOOD URINE: NEGATIVE
BUN SERPL-MCNC: 9 MG/DL
CALCIUM SERPL-MCNC: 10 MG/DL
CHLORIDE SERPL-SCNC: 96 MMOL/L
CHOLEST SERPL-MCNC: 239 MG/DL
CO2 SERPL-SCNC: 25 MMOL/L
COLOR: YELLOW
CREAT SERPL-MCNC: 0.68 MG/DL
EGFR: 105 ML/MIN/1.73M2
ESTIMATED AVERAGE GLUCOSE: 100 MG/DL
GLUCOSE QUALITATIVE U: NEGATIVE MG/DL
GLUCOSE SERPL-MCNC: 89 MG/DL
HBA1C MFR BLD HPLC: 5.1 %
HCT VFR BLD CALC: 43.4 %
HDLC SERPL-MCNC: 102 MG/DL
HGB BLD-MCNC: 14.3 G/DL
KETONES URINE: NEGATIVE MG/DL
LDLC SERPL CALC-MCNC: 129 MG/DL
LEUKOCYTE ESTERASE URINE: NEGATIVE
MCHC RBC-ENTMCNC: 32.3 PG
MCHC RBC-ENTMCNC: 32.9 GM/DL
MCV RBC AUTO: 98 FL
NITRITE URINE: NEGATIVE
NONHDLC SERPL-MCNC: 137 MG/DL
PH URINE: 6.5
PLATELET # BLD AUTO: 224 K/UL
POTASSIUM SERPL-SCNC: 3.9 MMOL/L
PROT SERPL-MCNC: 7.6 G/DL
PROTEIN URINE: NEGATIVE MG/DL
RBC # BLD: 4.43 M/UL
RBC # FLD: 12.9 %
SODIUM SERPL-SCNC: 134 MMOL/L
SPECIFIC GRAVITY URINE: <1.005
TRIGL SERPL-MCNC: 50 MG/DL
TSH SERPL-ACNC: 3.3 UIU/ML
UROBILINOGEN URINE: 0.2 MG/DL
WBC # FLD AUTO: 3.48 K/UL

## 2023-11-02 ENCOUNTER — APPOINTMENT (OUTPATIENT)
Dept: INTERNAL MEDICINE | Facility: CLINIC | Age: 53
End: 2023-11-02
Payer: COMMERCIAL

## 2023-11-02 DIAGNOSIS — R74.8 ABNORMAL LEVELS OF OTHER SERUM ENZYMES: ICD-10-CM

## 2023-11-02 PROCEDURE — 99442: CPT

## 2024-05-28 NOTE — ASU PATIENT PROFILE, ADULT - AS SC BRADEN FRICTION
Bleeding that does not stop/Fever greater than (need to indicate Fahrenheit or Celsius)/Wound/Surgical Site with redness, or foul smelling discharge or pus (3) no apparent problem

## 2024-07-15 NOTE — ED ADULT NURSE NOTE - TEMPLATE LIST FOR HEAD TO TOE ASSESSMENT
Goal Outcome Evaluation:  Plan of Care Reviewed With: patient        Progress: no change  Outcome Evaluation: No co pain. TERESA done today. Cont to monitor.                                General

## 2024-09-09 ENCOUNTER — RESULT REVIEW (OUTPATIENT)
Age: 54
End: 2024-09-09

## 2025-04-28 ENCOUNTER — APPOINTMENT (OUTPATIENT)
Dept: INTERNAL MEDICINE | Facility: CLINIC | Age: 55
End: 2025-04-28

## (undated) DEVICE — DRSG STOCKINETTE IMPERVIOUS MED

## (undated) DEVICE — VENODYNE/SCD SLEEVE CALF LARGE

## (undated) DEVICE — SOL IRR POUR H2O 250ML

## (undated) DEVICE — GUIDE AIM-ING GUIDES 1.5MM

## (undated) DEVICE — GLV 7 PROTEXIS (WHITE)

## (undated) DEVICE — DRAPE MAYO STAND 30"

## (undated) DEVICE — DRSG ACE BANDAGE 4" NS

## (undated) DEVICE — SLING ARM CHIEFTAIN MESH MEDIUM

## (undated) DEVICE — SUT MONOSOF 5-0 18" C-13

## (undated) DEVICE — DRSG XEROFORM 5 X 9"

## (undated) DEVICE — FOLEY TRAY 16FR 5CC LTX UMETER CLOSED

## (undated) DEVICE — MEDICATION LABELS W MARKER

## (undated) DEVICE — SPECIMEN CONTAINER 100ML

## (undated) DEVICE — CATH IV SAFE BC 20G X 1.16" (PINK)

## (undated) DEVICE — SUT POLYSORB 3-0 30" P-12 UNDYED

## (undated) DEVICE — SLING SHOULDER IMMOBILIZER CLINIC MEDIUM

## (undated) DEVICE — SUT MONOCRYL 3-0 18" PS-2 UNDYED

## (undated) DEVICE — WARMING BLANKET LOWER ADULT

## (undated) DEVICE — DRAPE IOBAN 23" X 23"

## (undated) DEVICE — PACK EXTREMITY

## (undated) DEVICE — GLV 8.5 PROTEXIS (WHITE)

## (undated) DEVICE — DRILL SOLID SIDE CUTTING 2.5X40MM

## (undated) DEVICE — DRSG STERISTRIPS 0.5 X 4"

## (undated) DEVICE — GLV 7.5 PROTEXIS (WHITE)

## (undated) DEVICE — SUT POLYSORB 4-0 P-12 UNDYED

## (undated) DEVICE — DRSG XEROFORM 1 X 8"

## (undated) DEVICE — DRSG CURITY GAUZE SPONGE 4 X 4" 12-PLY

## (undated) DEVICE — SOL IRR POUR NS 0.9% 500ML

## (undated) DEVICE — VISITEC 4X4

## (undated) DEVICE — DRSG WEBRIL 4"

## (undated) DEVICE — SUT POLYSORB 2-0 30" V-20 UNDYED

## (undated) DEVICE — TOURNIQUET CUFF 18" DUAL PORT DUAL BLADDER W PLC (BLACK)

## (undated) DEVICE — DRAPE C ARM UNIVERSAL

## (undated) DEVICE — SUT MONOCRYL 4-0 18" PS-2

## (undated) DEVICE — GLV 6.5 PROTEXIS (WHITE)

## (undated) DEVICE — SUT MONOSOF 4-0 18" P-12

## (undated) DEVICE — GOWN TRIMAX LG

## (undated) DEVICE — DRSG DERMABOND 0.7ML

## (undated) DEVICE — ELCTR BIPOLAR CORD J&J 12FT DISP

## (undated) DEVICE — POSITIONER FOAM EGG CRATE ULNAR 2PCS (PINK)

## (undated) DEVICE — MARKING PEN W RULER

## (undated) DEVICE — SLING ARM CHIEFTAIN MESH SMALL

## (undated) DEVICE — GLV 8 PROTEXIS (WHITE)

## (undated) DEVICE — DRILL SOLID SIDE CUTTING 2X40MM

## (undated) DEVICE — DRAPE TOWEL BLUE 17" X 24"

## (undated) DEVICE — BLADE SCALPEL SAFETYLOCK #15